# Patient Record
Sex: MALE | Race: WHITE | NOT HISPANIC OR LATINO | Employment: FULL TIME | URBAN - METROPOLITAN AREA
[De-identification: names, ages, dates, MRNs, and addresses within clinical notes are randomized per-mention and may not be internally consistent; named-entity substitution may affect disease eponyms.]

---

## 2023-08-18 PROBLEM — R73.09 ELEVATED GLUCOSE: Status: ACTIVE | Noted: 2023-08-18

## 2023-08-18 PROBLEM — E78.5 HYPERLIPIDEMIA: Status: ACTIVE | Noted: 2023-08-18

## 2023-08-18 PROBLEM — E83.52 HIGH CALCIUM LEVELS: Status: ACTIVE | Noted: 2023-08-18

## 2023-08-18 PROBLEM — E87.6 HYPOKALEMIA: Status: ACTIVE | Noted: 2023-08-18

## 2023-08-18 PROBLEM — L98.9 SKIN LESION OF BACK: Status: ACTIVE | Noted: 2023-08-18

## 2023-08-18 PROBLEM — I10 BENIGN ESSENTIAL HYPERTENSION: Status: ACTIVE | Noted: 2023-08-18

## 2023-08-18 RX ORDER — LOSARTAN POTASSIUM 100 MG/1
1 TABLET ORAL DAILY
COMMUNITY
Start: 2018-10-10

## 2023-08-18 RX ORDER — AMLODIPINE BESYLATE 10 MG/1
10 TABLET ORAL DAILY
COMMUNITY
Start: 2018-11-16 | End: 2024-04-29 | Stop reason: ALTCHOICE

## 2023-08-18 RX ORDER — POTASSIUM CHLORIDE 20 MEQ/1
20 TABLET, EXTENDED RELEASE ORAL DAILY PRN
COMMUNITY
Start: 2020-10-15

## 2023-08-18 RX ORDER — PRAVASTATIN SODIUM 40 MG/1
40 TABLET ORAL DAILY
COMMUNITY
Start: 2019-12-18 | End: 2024-04-29 | Stop reason: ALTCHOICE

## 2023-08-18 RX ORDER — CHLORTHALIDONE 50 MG/1
1 TABLET ORAL DAILY
COMMUNITY
Start: 2020-06-16

## 2023-10-27 ENCOUNTER — ANCILLARY PROCEDURE (OUTPATIENT)
Dept: RADIOLOGY | Facility: CLINIC | Age: 63
End: 2023-10-27
Payer: COMMERCIAL

## 2023-10-27 ENCOUNTER — OFFICE VISIT (OUTPATIENT)
Dept: ORTHOPEDIC SURGERY | Facility: CLINIC | Age: 63
End: 2023-10-27
Payer: COMMERCIAL

## 2023-10-27 DIAGNOSIS — M17.0 PRIMARY OSTEOARTHRITIS OF BOTH KNEES: Primary | ICD-10-CM

## 2023-10-27 DIAGNOSIS — M25.561 PAIN IN BOTH KNEES, UNSPECIFIED CHRONICITY: ICD-10-CM

## 2023-10-27 DIAGNOSIS — M25.562 PAIN IN BOTH KNEES, UNSPECIFIED CHRONICITY: ICD-10-CM

## 2023-10-27 PROCEDURE — 99214 OFFICE O/P EST MOD 30 MIN: CPT | Performed by: ORTHOPAEDIC SURGERY

## 2023-10-27 PROCEDURE — 99204 OFFICE O/P NEW MOD 45 MIN: CPT | Performed by: ORTHOPAEDIC SURGERY

## 2023-10-27 PROCEDURE — 73560 X-RAY EXAM OF KNEE 1 OR 2: CPT | Mod: LT

## 2023-10-27 PROCEDURE — 73562 X-RAY EXAM OF KNEE 3: CPT | Mod: RT

## 2023-10-27 PROCEDURE — 73564 X-RAY EXAM KNEE 4 OR MORE: CPT | Mod: LEFT SIDE | Performed by: RADIOLOGY

## 2023-10-28 NOTE — PROGRESS NOTES
Patient is a 62-year-old gentleman who presents today for evaluation of bilateral knee osteoarthritis.  The right is worse than left.  He has a history of remote arthroscopy on the right many many years ago.  He takes ibuprofen.  He has difficulty walking short distance.  He has a significant varus deformity.  He has difficulty going up and down stairs.  He extremity fed up with his quality of life.      Lateral knees:   AAOx3, NAD, walks with a moderate antalgic gait  Varus allignment  Range of motion lacks 10 degrees of full extension and flexes to 110 degrees  Stable to varus/valgus/anterior/posterior stress through out the range of motion  Slight laxity with varus stress  Diffuse medial  joint line tenderness to palpation  Moderate effusion  SILT in a leonidas/saph/per/tib distribution  5/5 knee extension/df/pf/ehl  ½ dorsalis pedis and posterior tibial pulse  no popliteal lymphadenopathy  no other overlying lesions  mood: euthymic  Respirations non labored    Plain films were reviewed by myself in clinic today.  They have advanced osteoarthritis of their knee with significant joint space narrowing, bone on bone changes, underlying sclerosis and tricompartmental osteophytic change.    Patient is now failed a greater than 3-month trial of reasonable conservative treatment and wishes proceed with surgery which is indicated at this time.  Discussed the risk benefits alternatives to surgery.  All of their questions were answered.    Procedure: right total hip  [place] [facility] [place]: M17.11  CPT: 26244  Stay: outpatient  Equipment: [facility]    I talked with the patient at length about risks, limitations, benefits and alternatives to total knee replacement today. I reviewed concerns about implant wear, loosening, breakage, infection and infection prophylaxis, DVT, PE, death and other medical and anesthetic complications of surgery. We talked about the potential for persistent pain following surgery since there  are many possible causes for knee and leg pain. The patient was advised that knee replacement will only relieve pain that is coming from the knee. We talked about limited range of motion following knee replacement and the importance of physical therapy and their motivation. We talked about improvements in pain management post-operatively and our accelerated rehab program. We talked about wound healing issues and neurovascular complications of surgery. I reviewed functional and activity restrictions in detail. We discussed the possible need for a homologous blood transfusion. We discussed the fact that many of our patients are able to go home in 1 day or less depending on their health, mobility, pre-op preparation, individual home situation and personal preference.  The patient has identified their personal goals of their joint replacement surgery and recovery and we have discussed them. These are documented in our [employer] patient engagement platform. In addition, we have discussed the advantages and disadvantages of various implant and fixation options, as well as various surgical approaches.  The basic concepts of the joint replacement procedure has been reviewed with the patient and the patient has been provided the opportunity to see an actual implant either in the office or in our pre-op education class.  All of the patients questions were answered. The patient can call my office to schedule surgery and the pre-op teaching class. I told the patient that they should contact their primary care physician to discuss fitness for surgery.    This note was created using voice recognition software and was not corrected for typographical or grammatical errors.

## 2023-11-04 ENCOUNTER — TRANSCRIBE ORDERS (OUTPATIENT)
Dept: OPERATING ROOM | Facility: HOSPITAL | Age: 63
End: 2023-11-04
Payer: COMMERCIAL

## 2023-11-04 DIAGNOSIS — M17.11 UNILATERAL PRIMARY OSTEOARTHRITIS, RIGHT KNEE: Primary | ICD-10-CM

## 2023-11-06 ENCOUNTER — HOSPITAL ENCOUNTER (OUTPATIENT)
Facility: HOSPITAL | Age: 63
Setting detail: OUTPATIENT SURGERY
End: 2023-11-06
Attending: ORTHOPAEDIC SURGERY | Admitting: ORTHOPAEDIC SURGERY
Payer: COMMERCIAL

## 2023-11-06 PROBLEM — M17.11 UNILATERAL PRIMARY OSTEOARTHRITIS, RIGHT KNEE: Status: ACTIVE | Noted: 2023-11-04

## 2023-11-21 ENCOUNTER — APPOINTMENT (OUTPATIENT)
Dept: PREADMISSION TESTING | Facility: HOSPITAL | Age: 63
End: 2023-11-21
Payer: COMMERCIAL

## 2024-01-11 ENCOUNTER — APPOINTMENT (OUTPATIENT)
Dept: ORTHOPEDIC SURGERY | Facility: CLINIC | Age: 64
End: 2024-01-11
Payer: COMMERCIAL

## 2024-03-09 ENCOUNTER — PATIENT MESSAGE (OUTPATIENT)
Dept: ORTHOPEDIC SURGERY | Facility: HOSPITAL | Age: 64
End: 2024-03-09
Payer: MEDICARE

## 2024-03-28 ENCOUNTER — OFFICE VISIT (OUTPATIENT)
Dept: ORTHOPEDIC SURGERY | Facility: CLINIC | Age: 64
End: 2024-03-28
Payer: MEDICARE

## 2024-03-28 VITALS — WEIGHT: 232.6 LBS | BODY MASS INDEX: 27.47 KG/M2 | HEIGHT: 77 IN

## 2024-03-28 DIAGNOSIS — M17.11 PRIMARY OSTEOARTHRITIS OF RIGHT KNEE: Primary | ICD-10-CM

## 2024-03-28 PROCEDURE — 99214 OFFICE O/P EST MOD 30 MIN: CPT | Performed by: ORTHOPAEDIC SURGERY

## 2024-03-28 PROCEDURE — 1036F TOBACCO NON-USER: CPT | Performed by: ORTHOPAEDIC SURGERY

## 2024-03-28 ASSESSMENT — PAIN - FUNCTIONAL ASSESSMENT: PAIN_FUNCTIONAL_ASSESSMENT: 0-10

## 2024-03-28 ASSESSMENT — PAIN SCALES - GENERAL: PAINLEVEL_OUTOF10: 5 - MODERATE PAIN

## 2024-03-28 ASSESSMENT — PAIN DESCRIPTION - DESCRIPTORS: DESCRIPTORS: ACHING;SORE;SHARP

## 2024-03-28 NOTE — PROGRESS NOTES
Patient is a 63-year-old gentleman who presents today for discussion of upcoming right total knee arthroplasty.  He is failed a greater than 3-month trial reasonable conservative treatment including anti-inflammatories, bracing and home exercise program.  He has a remote history of previous arthroscopy.  He has significant varus deformity.  He rates his pain at times as 10 out of 10.  He is difficulty walking any sort of distance.  He extremely fit up with quality of life and would like to proceed total knee arthroplasty which is indicated at this time.    Right knee:  AAOx3, NAD, walks with a moderate antalgic gait  Varus allignment  Range of motion lacks 10 degrees of full extension and flexes to 100 degrees  Stable to varus/valgus/anterior/posterior stress through out the range of motion  Slight laxity with varus stress  Diffuse medial  joint line tenderness to palpation  Moderate effusion  SILT in a leonidas/saph/per/tib distribution  5/5 knee extension/df/pf/ehl  ½ dorsalis pedis and posterior tibial pulse  no popliteal lymphadenopathy  no other overlying lesions  mood: euthymic  Respirations non labored    Plain films were reviewed by myself in clinic today.  They have severe osteoarthritis of their knee with significant joint space narrowing, bone on bone changes, underlying sclerosis and tricompartmental osteophytic change.    Patient is now failed a greater than 3-month trial of reasonable conservative treatment and wishes proceed with surgery which is indicated at this time.  Discussed the risk benefits alternatives to surgery.  All of their questions were answered.    Procedure: right total knee  Location: [facility] [place]  ICD10: M17.11  CPT: 93618  Stay: outpatient  Equipment: [facility]    I talked with the patient at length about risks, limitations, benefits and alternatives to total knee replacement today. I reviewed concerns about implant wear, loosening, breakage, infection and infection prophylaxis, DVT,  PE, death and other medical and anesthetic complications of surgery. We talked about the potential for persistent pain following surgery since there are many possible causes for knee and leg pain. The patient was advised that knee replacement will only relieve pain that is coming from the knee. We talked about limited range of motion following knee replacement and the importance of physical therapy and their motivation. We talked about improvements in pain management post-operatively and our accelerated rehab program. We talked about wound healing issues and neurovascular complications of surgery. I reviewed functional and activity restrictions in detail. We discussed the possible need for a homologous blood transfusion. We discussed the fact that many of our patients are able to go home in 1 day or less depending on their health, mobility, pre-op preparation, individual home situation and personal preference.  The patient has identified their personal goals of their joint replacement surgery and recovery and we have discussed them. These are documented in our [employer] patient engagement platform. In addition, we have discussed the advantages and disadvantages of various implant and fixation options, as well as various surgical approaches.  The basic concepts of the joint replacement procedure has been reviewed with the patient and the patient has been provided the opportunity to see an actual implant either in the office or in our pre-op education class.  All of the patients questions were answered. The patient can call my office to schedule surgery and the pre-op teaching class. I told the patient that they should contact their primary care physician to discuss fitness for surgery.    This note was created using voice recognition software and was not corrected for typographical or grammatical errors.

## 2024-04-17 ENCOUNTER — EDUCATION (OUTPATIENT)
Dept: ORTHOPEDIC SURGERY | Facility: HOSPITAL | Age: 64
End: 2024-04-17
Payer: MEDICARE

## 2024-04-29 ENCOUNTER — HOSPITAL ENCOUNTER (OUTPATIENT)
Dept: RADIOLOGY | Facility: HOSPITAL | Age: 64
Discharge: HOME | End: 2024-04-29
Payer: MEDICARE

## 2024-04-29 ENCOUNTER — PRE-ADMISSION TESTING (OUTPATIENT)
Dept: PREADMISSION TESTING | Facility: HOSPITAL | Age: 64
End: 2024-04-29
Payer: MEDICARE

## 2024-04-29 ENCOUNTER — LAB (OUTPATIENT)
Dept: LAB | Facility: LAB | Age: 64
End: 2024-04-29
Payer: MEDICARE

## 2024-04-29 ENCOUNTER — TELEPHONE (OUTPATIENT)
Dept: ORTHOPEDIC SURGERY | Facility: HOSPITAL | Age: 64
End: 2024-04-29

## 2024-04-29 VITALS
HEART RATE: 94 BPM | HEIGHT: 77 IN | WEIGHT: 230.05 LBS | RESPIRATION RATE: 14 BRPM | TEMPERATURE: 98.6 F | DIASTOLIC BLOOD PRESSURE: 94 MMHG | OXYGEN SATURATION: 95 % | BODY MASS INDEX: 27.16 KG/M2 | SYSTOLIC BLOOD PRESSURE: 140 MMHG

## 2024-04-29 DIAGNOSIS — Z01.818 PREOP TESTING: Primary | ICD-10-CM

## 2024-04-29 DIAGNOSIS — R73.09 ELEVATED GLUCOSE: ICD-10-CM

## 2024-04-29 DIAGNOSIS — M17.11 UNILATERAL PRIMARY OSTEOARTHRITIS, RIGHT KNEE: ICD-10-CM

## 2024-04-29 DIAGNOSIS — Z01.818 PREOP TESTING: ICD-10-CM

## 2024-04-29 LAB
ANION GAP SERPL CALC-SCNC: 14 MMOL/L (ref 10–20)
BASOPHILS # BLD AUTO: 0.02 X10*3/UL (ref 0–0.1)
BASOPHILS NFR BLD AUTO: 0.3 %
BUN SERPL-MCNC: 25 MG/DL (ref 6–23)
CALCIUM SERPL-MCNC: 10 MG/DL (ref 8.6–10.3)
CHLORIDE SERPL-SCNC: 100 MMOL/L (ref 98–107)
CO2 SERPL-SCNC: 27 MMOL/L (ref 21–32)
CREAT SERPL-MCNC: 1.05 MG/DL (ref 0.5–1.3)
EGFRCR SERPLBLD CKD-EPI 2021: 79 ML/MIN/1.73M*2
EOSINOPHIL # BLD AUTO: 0.04 X10*3/UL (ref 0–0.7)
EOSINOPHIL NFR BLD AUTO: 0.7 %
ERYTHROCYTE [DISTWIDTH] IN BLOOD BY AUTOMATED COUNT: 12.4 % (ref 11.5–14.5)
EST. AVERAGE GLUCOSE BLD GHB EST-MCNC: 117 MG/DL
GLUCOSE SERPL-MCNC: 101 MG/DL (ref 74–99)
HBA1C MFR BLD: 5.7 %
HCT VFR BLD AUTO: 47.5 % (ref 41–52)
HGB BLD-MCNC: 15.9 G/DL (ref 13.5–17.5)
IMM GRANULOCYTES # BLD AUTO: 0 X10*3/UL (ref 0–0.7)
IMM GRANULOCYTES NFR BLD AUTO: 0 % (ref 0–0.9)
LYMPHOCYTES # BLD AUTO: 1.34 X10*3/UL (ref 1.2–4.8)
LYMPHOCYTES NFR BLD AUTO: 22.3 %
MCH RBC QN AUTO: 29.7 PG (ref 26–34)
MCHC RBC AUTO-ENTMCNC: 33.5 G/DL (ref 32–36)
MCV RBC AUTO: 89 FL (ref 80–100)
MONOCYTES # BLD AUTO: 0.54 X10*3/UL (ref 0.1–1)
MONOCYTES NFR BLD AUTO: 9 %
NEUTROPHILS # BLD AUTO: 4.07 X10*3/UL (ref 1.2–7.7)
NEUTROPHILS NFR BLD AUTO: 67.7 %
NRBC BLD-RTO: NORMAL /100{WBCS}
PLATELET # BLD AUTO: 199 X10*3/UL (ref 150–450)
POTASSIUM SERPL-SCNC: 3.4 MMOL/L (ref 3.5–5.3)
RBC # BLD AUTO: 5.36 X10*6/UL (ref 4.5–5.9)
SODIUM SERPL-SCNC: 138 MMOL/L (ref 136–145)
WBC # BLD AUTO: 6 X10*3/UL (ref 4.4–11.3)

## 2024-04-29 PROCEDURE — 85025 COMPLETE CBC W/AUTO DIFF WBC: CPT

## 2024-04-29 PROCEDURE — 77073 BONE LENGTH STUDIES: CPT

## 2024-04-29 PROCEDURE — 36415 COLL VENOUS BLD VENIPUNCTURE: CPT

## 2024-04-29 PROCEDURE — 87081 CULTURE SCREEN ONLY: CPT

## 2024-04-29 PROCEDURE — 93005 ELECTROCARDIOGRAM TRACING: CPT

## 2024-04-29 PROCEDURE — 73562 X-RAY EXAM OF KNEE 3: CPT | Mod: RT

## 2024-04-29 PROCEDURE — 83036 HEMOGLOBIN GLYCOSYLATED A1C: CPT

## 2024-04-29 PROCEDURE — 99204 OFFICE O/P NEW MOD 45 MIN: CPT | Performed by: NURSE PRACTITIONER

## 2024-04-29 PROCEDURE — 93010 ELECTROCARDIOGRAM REPORT: CPT | Performed by: INTERNAL MEDICINE

## 2024-04-29 PROCEDURE — 80048 BASIC METABOLIC PNL TOTAL CA: CPT

## 2024-04-29 RX ORDER — METFORMIN HYDROCHLORIDE 500 MG/1
1 TABLET ORAL
COMMUNITY

## 2024-04-29 RX ORDER — METHYLPREDNISOLONE 4 MG
TABLET, DOSE PACK ORAL
COMMUNITY
End: 2024-06-11 | Stop reason: ALTCHOICE

## 2024-04-29 RX ORDER — IBUPROFEN 200 MG
200 TABLET ORAL EVERY 6 HOURS PRN
COMMUNITY
End: 2024-05-06 | Stop reason: HOSPADM

## 2024-04-29 RX ORDER — BISMUTH SUBSALICYLATE 262 MG
1 TABLET,CHEWABLE ORAL DAILY
COMMUNITY

## 2024-04-29 RX ORDER — CHLORHEXIDINE GLUCONATE ORAL RINSE 1.2 MG/ML
15 SOLUTION DENTAL DAILY
Qty: 30 ML | Refills: 0 | Status: SHIPPED | OUTPATIENT
Start: 2024-04-29 | End: 2024-05-06 | Stop reason: HOSPADM

## 2024-04-29 RX ORDER — ASPIRIN 81 MG/1
81 TABLET ORAL DAILY
COMMUNITY
End: 2024-05-06 | Stop reason: HOSPADM

## 2024-04-29 ASSESSMENT — PAIN - FUNCTIONAL ASSESSMENT: PAIN_FUNCTIONAL_ASSESSMENT: 0-10

## 2024-04-29 ASSESSMENT — ENCOUNTER SYMPTOMS
ENDOCRINE NEGATIVE: 1
NEUROLOGICAL NEGATIVE: 1
CARDIOVASCULAR NEGATIVE: 1
NECK NEGATIVE: 1
RESPIRATORY NEGATIVE: 1
GASTROINTESTINAL NEGATIVE: 1
EYES NEGATIVE: 1
CONSTITUTIONAL NEGATIVE: 1

## 2024-04-29 ASSESSMENT — PAIN DESCRIPTION - DESCRIPTORS: DESCRIPTORS: ACHING

## 2024-04-29 ASSESSMENT — PAIN SCALES - GENERAL: PAINLEVEL_OUTOF10: 3

## 2024-04-29 NOTE — TELEPHONE ENCOUNTER
Thank you for taking my call today.  All questions were answered at the time of the call, but please feel free to reach out to me via [employer] or phone, [phone], with any new questions or concerns.       We confirmed that you opted to enroll in our [id number] program so your discharge prescriptions will be available to take home at the time of discharge.  Please bring any prescription insurance coverage with you on the morning of surgery so that we can enter the information into our system.     We confirmed that your plan would be to Discharge Home same day (Rapid Recovery).    We confirmed that you have DME needed for recovery.     Use the provided body wash for 4 days before surgery and complete a 5th shower on the morning of surgery, this includes your body and hair.  Follow the directions as provided during preadmission testing.  The mouth wash will be used the night before and the morning of surgery.       As a reminder, if you do not hear from our team, please call [phone] between 2pm and 3pm the business day before your surgery to confirm your arrival time and details.        Please don't hesitate to reach out with additional questions or concerns.    Denise Singer MBA, BSN, RN-BC  ONESIMO HolleyN, RN  [facility]  [facility]  [phone]

## 2024-04-29 NOTE — CPM/PAT H&P
CPM/PAT Evaluation       Name: Toby Olea (Toby Olea)  /Age: 1960/63 y.o.     Visit Type:   In-Person       Chief Complaint: Knee pain/arthritis    62 yo male who is referred to PAT for evaluation by Dr Loving in advance of his R TKR on 24. He has failed conservative therapy.. He has a PMH of HTN, HPL, Hypercalcemia.  He has a remote history of previous arthroscopy.  He has significant varus deformity.  He rates his pain at times as 10 out of 10.  He is difficulty walking any sort of distance. He describes his knee discomfort as an ache when standing and walking, He takes advil for pain        Past Medical History:   Diagnosis Date    Hypertension 2006       Past Surgical History:   Procedure Laterality Date    APPENDECTOMY  2018    Appendectomy    COLONOSCOPY      KNEE ARTHROSCOPY W/ MENISCAL REPAIR  2018    Knee Arthroscopy With Medial Meniscus Repair    MENISCECTOMY      OTHER SURGICAL HISTORY  2018    Oral Surgery Tooth Extraction [place] [facility]    VASECTOMY  2018    Surgery Vas Deferens Vasectomy       Patient  has no history on file for sexual activity.    Family History   Problem Relation Name Age of Onset    Alcohol abuse Mother      Deafness Mother      Hearing loss Mother      COPD Mother      Arthritis Father      Asthma Father      Hypertension Father         No Known Allergies    Prior to Admission medications    Medication Sig Start Date End Date Taking? Authorizing Provider   amLODIPine (Norvasc) 10 mg tablet Take 1 tablet (10 mg) by mouth once daily. 18   Historical Provider, MD   chlorthalidone (Hygroton) 50 mg tablet Take 1 tablet (50 mg) by mouth once daily. 20   Historical Provider, MD   losartan (Cozaar) 100 mg tablet Take 1 tablet (100 mg) by mouth once daily. 10/10/18   Historical Provider, MD   potassium chloride CR (Klor-Con M20) 20 mEq ER tablet Take 1 tablet (20 mEq) by mouth once daily as needed. 10/15/20   Historical  Provider, MD   pravastatin (Pravachol) 40 mg tablet Take 1 tablet (40 mg) by mouth once daily. 12/18/19   Historical Provider, MD LINDSEY ROS:   Constitutional:   neg    Neuro/Psych:   neg    Eyes:   neg    Ears:   neg    Nose:   neg    Mouth:   neg    Throat:   neg    Neck:   neg    Cardio:   neg    Respiratory:   neg    Endocrine:   neg    GI:   neg    :   neg    Musculoskeletal:    See HPI  Hematologic:   neg    Skin:  neg        Physical Exam  Vitals and nursing note reviewed. Physical exam within normal limits.          PAT AIRWAY:   Airway:     Mallampati::  III    TM distance::  >3 FB    Neck ROM::  Full      Visit Vitals  BP (!) 140/94   Pulse 94   Temp 37 °C (98.6 °F) (Temporal)   Resp 14       DASI Risk Score    No data to display       Caprini DVT Assessment      Flowsheet Row Most Recent Value   DVT Score 8   Current Status Elective major lower extremity arthroplasty   Age 60-75 years   BMI 30 or less          Modified Frailty Index    No data to display       CHADS2 Stroke Risk  Current as of 23 minutes ago        N/A 3 to 100%: High Risk   2 to < 3%: Medium Risk   0 to < 2%: Low Risk     Last Change: N/A          This score determines the patient's risk of having a stroke if the patient has atrial fibrillation.        This score is not applicable to this patient. Components are not calculated.          Revised Cardiac Risk Index      Flowsheet Row Most Recent Value   Revised Cardiac Risk Calculator 0          Apfel Simplified Score    No data to display       Risk Analysis Index Results This Encounter    No data found in the last 1 encounters.       Stop Bang Score      Flowsheet Row Most Recent Value   Do you snore loudly? 1   Do you often feel tired or fatigued after your sleep? 0   Has anyone ever observed you stop breathing in your sleep? 0   Do you have or are you being treated for high blood pressure? 1   Recent BMI (Calculated) 27.3   Is BMI greater than 35 kg/m2? 0=No   Age older than 50  years old? 1=Yes   Is your neck circumference greater than 17 inches (Male) or 16 inches (Female)? 0   Gender - Male 1=Yes   STOP-BANG Total Score 4            Assessment and Plan   Denies dizziness, chest pain, pressure, palpitations, SOB, lightheadedness, LE swelling    Neuro:  No diagnosis or significant findings on chart review or clinical presentation and evaluation.     HEENT/Airway:  No diagnosis or significant findings on chart review or clinical presentation and evaluation.     Cardiovascular:  HTN- Hygroton 50 mg daily, Cozaar 100mg daily  HPL- stopped statin and takes red rice yeast  LESA: 0.6% risk for postoperative MACE  EKG - 4/29/24 NSR no acute changes per this author    Pulmonary:  No diagnosis or significant findings on chart review or clinical presentation and evaluation.   ARISCAT: <26 points, 1.6% risk of in-hospital postoperative pulmonary complication  PRODIGY: High risk for opioid induced respiratory depression  Discussed smoking cessation and deep breathing handout given    Renal:   Hypercalcemia  Pt at Moderate risk for perioperative ELENA based on Dynamic Predictive Scoring Tool for Perioperative ELENA  Lab Results   Component Value Date    GLUCOSE 103 (H) 04/14/2021    CALCIUM 10.0 04/14/2021     04/14/2021    K 4.0 07/12/2021    CO2 28 04/14/2021    CL 99 04/14/2021    BUN 26 (H) 04/14/2021    CREATININE 1.20 04/14/2021     Endocrine:  No diagnosis or significant findings on chart review or clinical presentation and evaluation.   Lab Results   Component Value Date    HGBA1C 5.4 12/20/2019     Hematologic:  No diagnosis or significant findings on chart review or clinical presentation and evaluation.    Pt supplied education/VTE handout  Lab Results   Component Value Date    WBC 5.0 06/16/2020    HGB 15.7 06/16/2020    HCT 46.7 06/16/2020    MCV 91 06/16/2020     06/16/2020       Gastrointestinal:   No diagnosis or significant findings on chart review or clinical presentation and  evaluation.     :  No diagnosis or significant findings on chart review or clinical presentation and evaluation.     Infectious disease:   No diagnosis or significant findings on chart review or clinical presentation and evaluation.   Prescription provided for CHG body wash and dental rinse. CHG use instructions reviewed and provided to patient.  Staph screen collected    Musculoskeletal:   See HPI    Preoperative risk assessment  ASA III  PHI - 4 points Risk for CARLOS A   NSQIP - Predicted length of stay days 0-1  ARISCAT - 3 points Low Risk 1.6%  DASI 58.2 Points 9.89 Mets  JHFRAT - points risk for falls  Clearance - not indicated  PAT Testing - CBC, BMP,  MRSA PCR, EKG  Results for orders placed or performed in visit on 04/29/24 (from the past 24 hour(s))   ECG 12 Lead   Result Value Ref Range    Ventricular Rate 85 BPM    Atrial Rate 85 BPM    IL Interval 160 ms    QRS Duration 90 ms    QT Interval 390 ms    QTC Calculation(Bazett) 464 ms    P Axis 33 degrees    R Axis 1 degrees    T Axis 38 degrees    QRS Count 14 beats    Q Onset 213 ms    P Onset 133 ms    P Offset 195 ms    T Offset 408 ms    QTC Fredericia 438 ms     Lab Results   Component Value Date    GLUCOSE 101 (H) 04/29/2024    CALCIUM 10.0 04/29/2024     04/29/2024    K 3.4 (L) 04/29/2024    CO2 27 04/29/2024     04/29/2024    BUN 25 (H) 04/29/2024    CREATININE 1.05 04/29/2024     Lab Results   Component Value Date    WBC 6.0 04/29/2024    HGB 15.9 04/29/2024    HCT 47.5 04/29/2024    MCV 89 04/29/2024     04/29/2024         *CLEARED FOR SURGERY       PENDING LABS/EKG-LABS REVIEWED, STABLE. OK TO PROCEED    Anesthesia:  No anesthesia complications    denies dental issues  Nonsmoker  3 drinks per week  no Drug abuse  no personal/family issues with Anesthesia  No nickel, shell fish, or iodine allergies    Patient is at acceptable risk to proceed with planned surgical procedure. Further cardiac risk stratification deferred at this  time.This patient is low risk candidate undergoing moderate risk procedure, patient is medically optimized for surgery    Discussed with patient medication instructions, NPO guidelines, and any questions or concerns. Patient does not need further workup prior to preceding with elective surgery based on based on risk assessment. .     CHLORHEXIDINE .12% DENTAL RINSE E PRESCIRBED PER  INFECTION PREVENTION PROTOCOL. PATIENT EDUCATED   Patient advised to call [facility] if does not receive Mouthwash    Face to Face patient contact time 30    NATY Adams 4/29/2024 10:12 AM

## 2024-04-29 NOTE — PREPROCEDURE INSTRUCTIONS
Medication List            Accurate as of April 29, 2024  9:39 AM. Always use your most recent med list.                aspirin 81 mg EC tablet  Medication Adjustments for Surgery: Stop 7 days before surgery     chlorhexidine 0.12 % solution  Commonly known as: Peridex  Use 15 mL in the mouth or throat once daily for 2 doses.     chlorthalidone 50 mg tablet  Commonly known as: Hygroton  Medication Adjustments for Surgery: Take morning of surgery with sip of water, no other fluids  Notes to patient: If a morning med     CHOLECALCIFEROL (VITAMIN D3) ORAL  Medication Adjustments for Surgery: Stop 7 days before surgery     co Q10-red yeast rice  mg capsule  Medication Adjustments for Surgery: Stop 7 days before surgery     FISH OIL ORAL  Medication Adjustments for Surgery: Stop 7 days before surgery     Glucosamine 500 mg tablet  Generic drug: glucosamine sulfate  Medication Adjustments for Surgery: Stop 7 days before surgery     ibuprofen 200 mg tablet  Medication Adjustments for Surgery: Stop 7 days before surgery     Klor-Con M20 20 mEq ER tablet  Generic drug: potassium chloride CR  Medication Adjustments for Surgery: Take morning of surgery with sip of water, no other fluids  Notes to patient: If a morning med     losartan 100 mg tablet  Commonly known as: Cozaar  Medication Adjustments for Surgery: Stop 1 day before surgery  Notes to patient: Take early morning 5/5/24     metFORMIN 500 mg tablet  Commonly known as: Glucophage  Medication Adjustments for Surgery: Stop 1 day before surgery     multivitamin tablet  Medication Adjustments for Surgery: Stop 7 days before surgery     SAW PALMETTO ORAL  Medication Adjustments for Surgery: Stop 7 days before surgery     TURMERIC ORAL  Medication Adjustments for Surgery: Stop 7 days before surgery            LAST DOSE OF VITAMINS, SUPPLEMENTS, HERBS, PROBIOTICS, AND FISH OIL, NO MOTRIN OR ADVIL OR ALEVE AFTER 4/28/24    Ok to take Tylenol 2-500mg tablets every 8  hr around the clock for pain and ok on morning of surgery if needed    CONTACT SURGEON'S OFFICE IF YOU DEVELOP:  * Fever = 100.4 F   * New respiratory symptoms (e.g. cough, shortness of breath, respiratory distress, sore throat)  * Recent loss of taste or smell  *Flu like symptoms such as headache, fatigue or gastrointestinal symptoms  * You develop any open sores, shingles, burning or painful urination   AND/OR:  * You no longer wish to have the surgery.  * Any other personal circumstances change that may lead to the need to cancel or defer this surgery.  *You were admitted to any hospital within one week of your planned procedure.    SMOKING:  *Quitting smoking can make a huge difference to your health and recovery from surgery.    *If you need help with quitting, call 8-977-QUIT-NOW.    THE DAY BEFORE SURGERY:  *Do not eat any food after midnight the night before your surgery.     Preoperative Fasting Guidelines    Why must I stop eating and drinking near surgery time?  With sedation, food or liquid in your stomach can enter your lungs causing serious complications  Increases nausea and vomiting    When do I need to stop eating and drinking before my surgery?  Do not eat any food after midnight the night before your surgery/procedure.  According to the 2023 [employer] Anest[employer] Patients who drink clear liquids on the morning of surgery have improved postoperative blood sugars, better outcomes, decreased length of stay, less nausea, less anxiety.   You may have up to 13.5 ounces of clear liquid until TWO hours before your instructed arrival time to the hospital.  This includes water, black tea/coffee, (no milk or cream) apple juice, and electrolyte drinks (Gatorade),   You may chew gum until TWO hours before your surgery/procedure      SURGICAL TIME:  *You will be contacted between 2 p.m. and 3 p.m. the business day before your surgery with your arrival time.  *If you haven't received a call by  3pm, call [phone]  *Scheduled surgery times may change and you will be notified if this occurs-check your personal voicemail for any updates.    ON THE MORNING OF SURGERY:  *Wear comfortable, loose fitting clothing.   *Do not use moisturizers, creams, lotions or perfume.  *All jewelry and valuables should be left at home.  *Prosthetic devices such as contact lenses, hearing aids, dentures, eyelash extensions, hairpins and body piercing must be removed before surgery.    BRING WITH YOU:  *Photo ID and insurance card  *Current list of medications and allergies  *Pacemaker/Defibrillator/Heart stent cards  *CPAP machine and mask  *Slings/splints/crutches  *Copy of your complete Advanced Directive/DHPOA-if applicable  *Neurostimulator implant remote    PARKING AND ARRIVAL:  *Check in at the Main Entrance desk and let them know you are here for surgery.    IF YOU ARE HAVING OUTPATIENT/SAME DAY SURGERY:  *A responsible adult MUST accompany you at the time of discharge and stay with you for 24 hours after your surgery.  *You may NOT drive yourself home after surgery.  *You may use a taxi or ride sharing service ([employer], Uber) to return home ONLY if you are accompanied by a friend or family member.  *Instructions for resuming your medications will be provided by your surgeon.    Thank you for coming to Pre Admission testing.     If I have prescribe medication please don't forget to  at your pharmacy.     Any questions about today's visit call [phone] and leave a message in the general mailbox.    Patient instructed to ambulate as soon as possible postoperatively to decrease thromboembolic risk.    Samantha Bundy, APRN-CNP    Thank you for visiting [facility].  If you have any changes to your health condition, please call the surgeons office to alert them and give them details of your symptoms.    APRN-C  Department of Anesthesiology and Perioperative Medicine      Additional  Instructions:     The Day before Surgery:  -Review your medication instructions, stop indicated medications  -You will be contacted in the evening regarding the time of your arrival to facility and surgery time    Day of Surgery:  -Review your medication instructions, take indicated medications  -Wear comfortable loose fitting clothing  -Do not use moisturizers, creams, lotions or perfume  -All jewelry and valuables should be left at home    Preoperative Deep Breathing Exercises    Why it is important to do deep breathing exercises before my surgery?  Deep breathing exercises strengthen your breathing muscles.  This helps you to recover after your surgery and decreases the chance of breathing complications.      How are the deep breathing exercises done?  Sit straight with your back supported.  Breathe in deeply and slowly through your nose. Your lower rib cage should expand and your abdomen may move forward.  Hold that breath for 3 to 5 seconds.  Breathe out through pursed lips, slowly and completely.  Rest and repeat 10 times every hour while awake.  Rest longer if you become dizzy or lightheaded.        Patient Information: Incentive Spirometer  What is an incentive spirometer?  An incentive spirometer is a device used before and after surgery to “exercise” your lungs.  It helps you to take deeper breaths to expand your lungs.  Below is an example of a basic incentive spirometer.  The device you receive may differ slightly but they all function the same.    Why do I need to use an incentive spirometer?  Using your incentive spirometer prepares your lungs for surgery and helps prevent lung problems after surgery.  How do I use my incentive spirometer?  When you're using your incentive spirometer, make sure to breathe through your mouth. If you breathe through your nose, the incentive spirometer won't work properly. You can hold your nose if you have trouble.  If you feel dizzy at any time, stop and rest. Try again  at a later time.  Follow the steps below:  Set up your incentive spirometer, expand the flexible tubing and connect to the outlet.  Sit upright in a chair or bed. Hold the incentive spirometer at eye level.   Put the mouthpiece in your mouth and close your lips tightly around it. Slowly breathe out (exhale) completely.  Breathe in (inhale) slowly through your mouth as deeply as you can. As you take a breath, you will see the piston rise inside the large column. While the piston rises, the indicator should move upwards. It should stay in between the 2 arrows (see Figure).  Try to get the piston as high as you can, while keeping the indicator between the arrows.   If the indicator doesn't stay between the arrows, you're breathing either too fast or too slow.  When you get it as high as you can, hold your breath for 10 seconds, or as long as possible. While you're holding your breath, the piston will slowly fall to the base of the spirometer.  Once the piston reaches the bottom of the spirometer, breathe out slowly through your mouth. Rest for a few seconds.  Repeat 10 times. Try to get the piston to the same level with each breath.  Repeat every hour while awake  You can carefully clean the outside of the mouthpiece with an alcohol wipe or soap and water.         Ways You Can Help Prevent Blood Clots             This handout explains some simple things you can do to help prevent blood clots.      Blood clots are blockages that can form in the body's veins. When a blood clot forms in your deep veins, it may be called a deep vein thrombosis, or DVT for short. Blood clots can happen in any part of the body where blood flows, but they are most common in the arms and legs. If a piece of a blood clot breaks free and travels to the lungs, it is called a pulmonary embolus (PE). A PE can be a very serious problem.         Being in the hospital or having surgery can raise your chances of getting a blood clot because you may not  be well enough to move around as much as you normally do.         Ways you can help prevent blood clots in the hospital         Wearing SCDs. SCDs stands for Sequential Compression Devices.   SCDs are special sleeves that wrap around your legs  They attach to a pump that fills them with air to gently squeeze your legs every few minutes.   This helps return the blood in your legs to your heart.   SCDs should only be taken off when walking or bathing.   SCDs may not be comfortable, but they can help save your life.               Wearing compression stockings - if your doctor orders them. These special snug fitting stockings gently squeeze your legs to help blood flow.       Walking. Walking helps move the blood in your legs.   If your doctor says it is ok, try walking the halls at least   5 times a day. Ask us to help you get up, so you don't fall.      Taking any blood thinning medicines your doctor orders.        Page 1 of 2     [facility]; 3/23   Ways you can help prevent blood clots at home       Wearing compression stockings - if your doctor orders them. ? Walking - to help move the blood in your legs.       Taking any blood thinning medicines your doctor orders.      Signs of a blood clot or PE      Tell your doctor or nurse know right away if you have of the problems listed below.    If you are at home, seek medical care right away. Call 911 for chest pain or problems breathing.               Signs of a blood clot (DVT) - such as pain,  swelling, redness or warmth in your arm or leg      Signs of a pulmonary embolism (PE) - such as chest     pain or feeling short of breath

## 2024-04-29 NOTE — TELEPHONE ENCOUNTER
Please call [phone] at your earliest convenience to discuss details for your upcoming surgery with Dr. Christian Loving.  I can be reached during normal business hours, Monday through Friday.    Thanks,  Denise Singer MBA, BSN, RN-BC  ONESIMO HolleyN-RN  [facility]  [facility]  [phone]

## 2024-05-01 LAB — STAPHYLOCOCCUS SPEC CULT: NORMAL

## 2024-05-02 LAB
ATRIAL RATE: 85 BPM
P AXIS: 33 DEGREES
P OFFSET: 195 MS
P ONSET: 133 MS
PR INTERVAL: 160 MS
Q ONSET: 213 MS
QRS COUNT: 14 BEATS
QRS DURATION: 90 MS
QT INTERVAL: 390 MS
QTC CALCULATION(BAZETT): 464 MS
QTC FREDERICIA: 438 MS
R AXIS: 1 DEGREES
T AXIS: 38 DEGREES
T OFFSET: 408 MS
VENTRICULAR RATE: 85 BPM

## 2024-05-02 NOTE — CPM/PAT H&P
CPM/PAT Evaluation       Name: Toby Olea (Toby Olea)  /Age: 1960/63 y.o.       PATIENT PRE-OP LABS   POTASSIUM OF 3.4 ON 2024  PATIENT HAS NO PCP LISTED  ZECHARIAH DERAS PA-C NOTIFIED

## 2024-05-05 PROBLEM — M17.11 LOCALIZED OSTEOARTHRITIS OF RIGHT KNEE: Status: ACTIVE | Noted: 2024-05-05

## 2024-05-05 RX ORDER — METFORMIN HYDROCHLORIDE 500 MG/1
500 TABLET ORAL
Status: CANCELLED | OUTPATIENT
Start: 2024-05-06

## 2024-05-05 RX ORDER — OXYCODONE HYDROCHLORIDE 5 MG/1
5 TABLET ORAL EVERY 6 HOURS PRN
Qty: 28 TABLET | Refills: 0 | Status: SHIPPED | OUTPATIENT
Start: 2024-05-05 | End: 2024-05-13

## 2024-05-05 RX ORDER — LOSARTAN POTASSIUM 100 MG/1
100 TABLET ORAL DAILY
Status: CANCELLED | OUTPATIENT
Start: 2024-05-05

## 2024-05-05 RX ORDER — ACETAMINOPHEN 500 MG
1000 TABLET ORAL EVERY 6 HOURS PRN
Qty: 240 TABLET | Refills: 0 | Status: SHIPPED | OUTPATIENT
Start: 2024-05-05 | End: 2024-06-11

## 2024-05-05 RX ORDER — ASPIRIN 81 MG/1
81 TABLET ORAL 2 TIMES DAILY
Qty: 60 TABLET | Refills: 0 | Status: SHIPPED | OUTPATIENT
Start: 2024-05-05 | End: 2024-06-11

## 2024-05-05 RX ORDER — POLYETHYLENE GLYCOL 3350 17 G/17G
17 POWDER, FOR SOLUTION ORAL DAILY
Qty: 238 G | Refills: 0 | Status: SHIPPED | OUTPATIENT
Start: 2024-05-05 | End: 2024-06-11 | Stop reason: ALTCHOICE

## 2024-05-05 RX ORDER — PANTOPRAZOLE SODIUM 40 MG/1
40 TABLET, DELAYED RELEASE ORAL DAILY
Qty: 30 TABLET | Refills: 0 | Status: SHIPPED | OUTPATIENT
Start: 2024-05-05 | End: 2024-06-05

## 2024-05-05 RX ORDER — TRAMADOL HYDROCHLORIDE 50 MG/1
50 TABLET ORAL EVERY 6 HOURS PRN
Qty: 28 TABLET | Refills: 0 | Status: SHIPPED | OUTPATIENT
Start: 2024-05-05 | End: 2024-05-13

## 2024-05-05 RX ORDER — CHLORTHALIDONE 25 MG/1
50 TABLET ORAL DAILY
Status: CANCELLED | OUTPATIENT
Start: 2024-05-05

## 2024-05-05 RX ORDER — MELOXICAM 15 MG/1
15 TABLET ORAL DAILY
Qty: 30 TABLET | Refills: 0 | Status: SHIPPED | OUTPATIENT
Start: 2024-05-05 | End: 2024-06-05

## 2024-05-05 RX ORDER — DOCUSATE SODIUM 100 MG/1
100 CAPSULE, LIQUID FILLED ORAL 2 TIMES DAILY
Qty: 60 CAPSULE | Refills: 0 | Status: SHIPPED | OUTPATIENT
Start: 2024-05-05 | End: 2024-06-05

## 2024-05-05 NOTE — DISCHARGE INSTRUCTIONS
MD Alka Perkins MPAS, SHE, ATC  Adult Reconstruction and Joint Replacement Surgery  Phone: [phone]     Fax: [phone]        DISCHARGE INSTRUCTIONS      PLEASE READ CAREFULLY BEFORE CONTACTING YOUR PROVIDER.    WE WORK COLLABORATIVELY AS A TEAM. CALLING MULTIPLE STAFF MEMBERS REGARDING THE SAME ISSUE WILL DELAY YOUR CARE.    [employer] IS THE PREFERRED COMMUNICATION FOR ALL TEAM MEMBERS.    POSTOPERATIVE INSTRUCTIONS: TOTAL HIP & TOTAL KNEE ARTHROPLASTY    JOINT CARE TEAM  Please use the information below to contact your care team following surgery.  If you are leaving a message or using the [employer] Chart portal, please include your full name, date of birth and date of surgery so that we can correctly identify you.  Your call will be returned within 1-2 business days, please do not leave multiple messages with other staff regarding a single issue while you are awaiting a return call.     Who to call Contact Information Matters needing handled   Alka Quevedo PA-C  Physician Assistant [facility]   Medical questions/concerns       Jaelyn Hernandez-    [email or web address]           [phone]  opt. 2    [phone] fax  [phone]         Scheduling office Visits  Medical questions/concerns  Leave of Absence or other paperwork  Any concerns more than 6 weeks from surgery - an appointment will need to be made   Denise Singer MBA, BSN, RN-BC  Ortho Nurse Navigator [place]    Dee Guerrero RN, BSN  Ortho Nurse Navigator [place]        __________________________________    Jun Sahni RN, BSN  Ortho Coordinator [facility] ECHOLSN-BC  Ortho Nurse Navigator [facility]       [phone] Please call staff at the institution in which you had surgery for prescription refills        Prescription Refills  Nursing, medical question related questions or concerns within 6 weeks of surgery   Orders  for Outpatient Physical Therapy             MEDICATION REFILLS - MyChart or Nurse Navigator (Above) at the institution in which you had surgery. [facility] or [facility].    -You will NOT receive a call indicating that your prescription has been filled.  Please contact your pharmacy with any questions.    Medication refills will be filled Monday-Friday 7am to 1pm ONLY. Please call the nurse navigator office or send a [employer] message for a refill request.  Any requests received outside of this timeframe will be handled on the next business day.  Please do not call multiple times or call other members of the care team for medication needs, this will cause the refill to take longer.    Per State and Institutional policy, pain medications can only be refilled every 7 days for up to six weeks following surgery.    My Chart Portal: If you are using the My Chart portal and are requesting a medication refill, please list what type of surgery you had and left or right side, medication that needs refilled, and pharmacy you would like your medication sent.     WEIGHT BEARING- weight bearing as tolerated to operative extremity     ACTIVITY-As Tolerated    DRIVING & TRAVEL AFTER SURGERY   Patients should anticipate waiting at least 4-6 weeks before traveling long distances after surgery.  You will need to stop to walk around ever 1 hour during your travel to help with blood clot prevention.    Patients may not drive until cleared by the joint nurse or the office and you are off of all narcotics.    DENTAL PROCEDURES & CLEANINGS  You must wait a minimum of 3 months for elective dental appointments after a total joint replacement, including routine cleanings or dental work including bridges, crowns, extractions, etc.. Unless, it is an emergency. You will need a prophylactic antibiotic lifelong prior to any dental visit, cleaning or procedure. Your surgeon's office or your dentist may provide a prescription antibiotic. Antibiotics are  a lifelong need before dental appointments.      You do not need antibiotics for endoscopic procedures such as colonoscopy or EGD, dermatologic biopsies or eye surgeries.    WOUND CARE  If you experience continued drainage or bleeding, you may cover with abdominal/Maxi pads (purchase at local drug store).  Knee replacements should wrap with an ace wrap.  You may shower with waterproof dressing on. Your surgical bandage will be removed by your home therapist 1 week after surgery. If you have staples intact, home care will remove in 2 weeks. If you have sutures intact, you will need to return to the office in 2 weeks for suture removal. Once the dressing is removed by home care, you may continue to shower. Let soap and water wash over the wound. DO NOT SCRUB.  Steri-strips under the bandage will remain in place until they fall off on their own.  If they are loose, you may gently remove.  If they have not fallen off in two weeks, gently peel them off. Do not remove if pulling causes resistance against the incision.  You will see suture tails sticking out of the ends of the incision.  DO NOT CUT THEM.  They will fall off when the sutures dissolve.  If they are bothersome, cover with a band aid.  Do not soak in a bath tub, hot tub, pool or lake until you are 8 weeks out from surgery.  Do not apply lotions, creams or ointments until you are 6 weeks out from surgery,    PAIN, SWELLING, BRUISING & CLICKING  Pain and swelling are a natural part of your recovery which is considered normal for up to a year after surgery.  Symptoms may be treated with movement, ice, compression stockings, elevating your leg, and by following the pain medication regimen as prescribed.  Bruising is normal for several weeks after surgery. You may also have leg swelling and pain in your shin.  You may ice areas that are tender to help with discomfort.  You are required to wear the provided compression stockings, every day, for 4 weeks following  surgery.  Remove the stockings at night and place them back on in the morning.  Pain and swelling may temporarily increase with an increase in activity or exercise.  Use ice after activity.  Audible clicking with movement or exercises is considered normal following joint replacement.  If this persists at 6 months or 1 year, please notify your surgeon.  You may also feel decreased sensation or numbness near the incision site.  This is normal and sensation may or may not return.    PERSONAL HYGIENE  You may shower upon discharging from the hospital.  Soap and water is permitted to run over the surgical dressing, steri-strips and incision.  Do not scrub directly over these items.  DO NOT soak your incision in a bath, hot tub, pool or pond/lake for a minimum of 8 weeks following your surgery.  DO NOT use lotions, creams, ointments on your wound for a minimum of 6 weeks following your surgery. At that time you may use vitamin E to assist with softening of your incision.      RESTARTING HOME ROUTINE - DIET & MEDICATIONS  Post-operative constipation can result due to a combination of inactivity, anesthesia and pain medication. To help prevent this, you should increase your water and fiber intake. Physical activity such as walking will also help stimulate the bowels.   You may resume your normal diet when you discharge home.    To avoid constipation, choose foods that help promote good bowel habits, such as foods high in fiber.  You may restart your home medications the following day after your surgery UNLESS you have been given alternate instructions.  Follow the instructions given to you on your hospital discharge instructions for more information regarding your home medications.  IF YOU EXPERIENCE NAUSEA OR DIARRHEA, FOLLOW THE B.R.A.T. DIET UNTIL SYMPTOMS RESOLVE.  If you are experiencing vomiting that lasts more than 24 hours, please contact your joint nurse.      IN-HOME PHYSICAL THERAPY & OUTPATIENT PHYSICAL  THERAPY  In-home physical therapy will start 1-2 days after you get home from the hospital.    The home care agency will call within the first 24-48 hours to set up their first visit.  Please do not call your care team to inquire during this timeframe.  Continue the exercises you were given in the hospital until you have been seen by in-home therapy.  Make sure to provide a phone number with the ability for the home care staff to leave a message if you do not answer your phone.    Outpatient physical therapy following knee replacement surgery should begin 2-3 weeks after surgery.  You will be given physical therapy order prior to discharge from the hospital. You should call to schedule this appointment ASAP if not already scheduled before surgery.  Waiting until you are ready for outpatient physical therapy will cause a delay in your care.  You may choose any outpatient physical therapy location.      EMERGENCIES - WHEN TO CONTACT THE SURGEON'S OFFICE IMMEDIATELY  Fever >101 with chills that has been present for at least 48 hours.   Excessive bleeding from incision that will not slow down. A small amount of drainage is normal and expected.  Once pressure is applied and the area is covered, do not continue to check the area regularly.  This will remove pressure and bleeding will continue.  Leave in place for 4-6 hours.  Signs of infection of incision-excessive drainage that is soaking through your dressing (especially if it is pus-like), redness that is spreading out from the edges of your incision, or increased warmth around the area.  Excruciating pain for which the pain medication, taken as instructed, is not helping.  Severe calf pain.  Go directly to the emergency room or call 911, if you are experiencing chest pain or difficulty breathing.    After Hour and Weekend Emergency Answering Service [phone]    ICE & COLD THERAPY INSTRUCTIONS    To assist with pain control and post-op swelling, you should be using  ice regularly throughout recovery, especially for the first 6 weeks, regardless of the cold therapy method you use.      Always make sure there is a layer of protection between the cold pad and your skin.    If you are using ICE PACKS or GEL PACKS, you will need to alternate 20 minutes on, 20 minutes off twice per hour.    If you are using an ICE MACHINE, please follow the provided ice machine instructions.  These devices differ from ice or ice packs whereas the mechanism circulates water through tubing and a pad to provide longer periods of cold therapy to the desired site.  You can use your cold devices around the clock for optimal comfort.  We recommend using cold therapy after working with therapy or completing exercises on your own.  There is no set schedule in which you must follow while using cold therapy.  Below are a few points to remember when using a cold therapy device:    You do not need to need to use the 20 on, 20 off method.  Detach the pad from the cooler and ambulate at least once every hour.  You can check your skin under the pad at this time.  You may wear the cold therapy device during periods of sleep including overnight.  If you wake up during the night, you can check the skin at this time.  You do not need to wake up specifically to perform skin checks.  Empty the cooler and pad when device is not in use.  Follow 's instructions for cleaning your cold therapy device.    DISCHARGE MEDICATIONS - Please reference the sample schedule on the reverse side for instructions on how to best schedule medications.    PAIN MEDICATION    ___X_ Tramadol / Oxycodone  Tramadol and Oxycodone have been prescribed for post-operative pain control.    These medications will only be refilled ONCE every 7 days for a period of up to 6 weeks following surgery.  After 6 weeks, you will transition to acetaminophen and over -the- counter anti-inflammatories such as Ibuprofen, Advil or Aleve in conjunction  with ICE/COLD THERAPY.   Side effects may be constipation and nausea, vomiting, sleepiness, dizziness, lightheadedness, headache, blurred vision, dry mouth sweating, itching (if you have itching, over-the -counter Benadryl can be used as needed).  You may NOT operate a motor vehicle while taking these medications or have been cleared by your care team.     ___X_ Acetaminophen (Tylenol)  Acetaminophen has been prescribed as an adjunct for pain control. Take two 500 mg tablets every 6 hours for 4 weeks. You will not receive a refill on this medication.  Do not exceed 4000mg of acetaminophen within a 24 hour period.  Side effects may include nausea, heartburn, drowsiness, and headache.    ___X_ Meloxicam (Mobic)-Meloxicam has been prescribed as an adjunct anti-inflammatory to assist in pain control.    Take one 15mg tablet once daily for 4 weeks.  You will not receive refills on this medication.   Side effects may include nausea.  May not be prescribed if you are on a more potent blood thinner than aspirin or have chronic kidney disease.    BLOOD THINNER    ___X_ Blood Thinner   A blood thinner has been prescribed to prevent blood clots in your leg or lungs. Take as prescribed on the bottle for 4 weeks. You will not receive a refill on this medication.    ANTI NAUSEA    ___X_ Proton Pump Inhibitor (PPI)-Stomach Acid Reduction Medication  If you are already on a PPI, you will continue your regular medication. If you are not, you will be prescribed Pantoprazole to help with nausea and protect your stomach while taking pain medication.  You will not receive a refill on this medication.    STOOL SOFTENERS    ___X_ Colace (Docusate Sodium) & Miralax (polyethylene glycol)  Take both medications to help with constipation while using the Oxycodone and Tramadol for pain control.  You will not receive a refill on this medication.    Continued Constipation  If you continue to be constipated despite daily use of Miralax and  Colace, you try an over-the-counter Dulcolax Suppository and use per instructions on the package.       SPECIAL INSTRUCTIONS   ***    You will not receive refills on the following medications.   Acetaminophen (Tylenol  Meloxicam  Miralax  Colace  Proton Pump Inhibitor (PPI)  Blood Thinner    Pain Medication Refills - Ortho Nurse Navigator or MyChart- Monday through Friday 7am-1pm    FOLLOW-UP- You should have an appointment with either Dr. Loving or KENN Ovalle in 6 weeks.         SAMPLE              The times below are an example of how to organize medications to optimize pain control  Your actual medication schedule may vary based on your last dose taken IN THE HOSPITAL      Time 3:00 am 6:00 am 9:00 am 12:00 pm 3:00 pm 6:00 pm 9:00 pm 12:00 am   Medications Tramadol Tylenol  Oxycodone  Miralax   Blood Thinner  Colace  Pantoprazole or other PPI  Tramadol  Meloxicam Tylenol  Oxycodone Tramadol Tylenol  Oxycodone  Miralax Blood Thinner  Colace  Tramadol   Tylenol  Oxycodone            You may begin to wean off the pain medication as your pain remains controlled with increased activity.  The schedules provided are meant to serve as an example.  You may wean off based on your pain control.  Please note that pain medications are not filled beyond 6 weeks after surgery.              The times below are an example of how to WEAN OFF medications WHILE CONTINUING TO OPTIMIZE PAIN CONTROL.  Your actual medication schedule may vary based on your last dose taken.    Time 12:00am 4:00am 8:00am 12:00pm 4:00pm 8:00pm   Med Tramadol Oxycodone   Tramadol Oxycodone Tramadol Oxycodone     Time 12:00am 6:00am 12:00pm 6:00pm   Med Tramadol Oxycodone   Tramadol Oxycodone     Time 12:00am 8:00am 4:00pm   Med Tramadol Oxycodone   Tramadol     Time 12:00am 12:00pm   Med Tramadol Tramadol         TOTAL KNEE REPLACEMENT PATIENTS SHOULD TRANSITION TO OUTPATIENT PHYSICAL THERAPY NO MORE THAN 3 WEEKS FOLLOWING SURGERY.  PLEASE SEE THE  LIST OF  FACILITIES BELOW.  CALL TO SCHEDULE YOUR FIRST APPOINTMENT BEFORE YOU HAVE YOUR SURGERY.

## 2024-05-06 ENCOUNTER — DOCUMENTATION (OUTPATIENT)
Dept: HOME HEALTH SERVICES | Facility: HOME HEALTH | Age: 64
End: 2024-05-06

## 2024-05-06 ENCOUNTER — ANESTHESIA (OUTPATIENT)
Dept: OPERATING ROOM | Facility: HOSPITAL | Age: 64
End: 2024-05-06
Payer: MEDICARE

## 2024-05-06 ENCOUNTER — PHARMACY VISIT (OUTPATIENT)
Dept: PHARMACY | Facility: CLINIC | Age: 64
End: 2024-05-06
Payer: COMMERCIAL

## 2024-05-06 ENCOUNTER — HOSPITAL ENCOUNTER (OUTPATIENT)
Facility: HOSPITAL | Age: 64
Discharge: HOME | End: 2024-05-06
Attending: ORTHOPAEDIC SURGERY | Admitting: ORTHOPAEDIC SURGERY
Payer: MEDICARE

## 2024-05-06 ENCOUNTER — ANESTHESIA EVENT (OUTPATIENT)
Dept: OPERATING ROOM | Facility: HOSPITAL | Age: 64
End: 2024-05-06
Payer: MEDICARE

## 2024-05-06 ENCOUNTER — HOME HEALTH ADMISSION (OUTPATIENT)
Dept: HOME HEALTH SERVICES | Facility: HOME HEALTH | Age: 64
End: 2024-05-06
Payer: MEDICARE

## 2024-05-06 VITALS
TEMPERATURE: 98.1 F | WEIGHT: 227.74 LBS | BODY MASS INDEX: 27.73 KG/M2 | SYSTOLIC BLOOD PRESSURE: 121 MMHG | RESPIRATION RATE: 18 BRPM | HEIGHT: 76 IN | DIASTOLIC BLOOD PRESSURE: 81 MMHG | OXYGEN SATURATION: 95 % | HEART RATE: 60 BPM

## 2024-05-06 DIAGNOSIS — M17.11 UNILATERAL PRIMARY OSTEOARTHRITIS, RIGHT KNEE: ICD-10-CM

## 2024-05-06 DIAGNOSIS — Z96.651 S/P TKR (TOTAL KNEE REPLACEMENT) USING CEMENT, RIGHT: Primary | ICD-10-CM

## 2024-05-06 PROCEDURE — 7100000010 HC PHASE TWO TIME - EACH INCREMENTAL 1 MINUTE: Performed by: ORTHOPAEDIC SURGERY

## 2024-05-06 PROCEDURE — 64447 NJX AA&/STRD FEMORAL NRV IMG: CPT | Performed by: STUDENT IN AN ORGANIZED HEALTH CARE EDUCATION/TRAINING PROGRAM

## 2024-05-06 PROCEDURE — 2500000004 HC RX 250 GENERAL PHARMACY W/ HCPCS (ALT 636 FOR OP/ED): Performed by: STUDENT IN AN ORGANIZED HEALTH CARE EDUCATION/TRAINING PROGRAM

## 2024-05-06 PROCEDURE — G0378 HOSPITAL OBSERVATION PER HR: HCPCS

## 2024-05-06 PROCEDURE — 2500000004 HC RX 250 GENERAL PHARMACY W/ HCPCS (ALT 636 FOR OP/ED): Performed by: PHYSICIAN ASSISTANT

## 2024-05-06 PROCEDURE — 2720000007 HC OR 272 NO HCPCS: Performed by: ORTHOPAEDIC SURGERY

## 2024-05-06 PROCEDURE — 27447 TOTAL KNEE ARTHROPLASTY: CPT | Performed by: PHYSICIAN ASSISTANT

## 2024-05-06 PROCEDURE — 3700000001 HC GENERAL ANESTHESIA TIME - INITIAL BASE CHARGE: Performed by: ORTHOPAEDIC SURGERY

## 2024-05-06 PROCEDURE — 3600000005 HC OR TIME - INITIAL BASE CHARGE - PROCEDURE LEVEL FIVE: Performed by: ORTHOPAEDIC SURGERY

## 2024-05-06 PROCEDURE — RXMED WILLOW AMBULATORY MEDICATION CHARGE

## 2024-05-06 PROCEDURE — 2500000005 HC RX 250 GENERAL PHARMACY W/O HCPCS: Performed by: ORTHOPAEDIC SURGERY

## 2024-05-06 PROCEDURE — 2780000003 HC OR 278 NO HCPCS: Performed by: ORTHOPAEDIC SURGERY

## 2024-05-06 PROCEDURE — 7100000001 HC RECOVERY ROOM TIME - INITIAL BASE CHARGE: Performed by: ORTHOPAEDIC SURGERY

## 2024-05-06 PROCEDURE — 2500000004 HC RX 250 GENERAL PHARMACY W/ HCPCS (ALT 636 FOR OP/ED): Performed by: ANESTHESIOLOGIST ASSISTANT

## 2024-05-06 PROCEDURE — 97110 THERAPEUTIC EXERCISES: CPT | Mod: GP

## 2024-05-06 PROCEDURE — 94760 N-INVAS EAR/PLS OXIMETRY 1: CPT

## 2024-05-06 PROCEDURE — 27447 TOTAL KNEE ARTHROPLASTY: CPT | Performed by: ORTHOPAEDIC SURGERY

## 2024-05-06 PROCEDURE — C1776 JOINT DEVICE (IMPLANTABLE): HCPCS | Performed by: ORTHOPAEDIC SURGERY

## 2024-05-06 PROCEDURE — A4649 SURGICAL SUPPLIES: HCPCS | Performed by: ORTHOPAEDIC SURGERY

## 2024-05-06 PROCEDURE — 97530 THERAPEUTIC ACTIVITIES: CPT | Mod: GP

## 2024-05-06 PROCEDURE — 97116 GAIT TRAINING THERAPY: CPT | Mod: GP

## 2024-05-06 PROCEDURE — 2500000005 HC RX 250 GENERAL PHARMACY W/O HCPCS: Performed by: ANESTHESIOLOGIST ASSISTANT

## 2024-05-06 PROCEDURE — 7100000009 HC PHASE TWO TIME - INITIAL BASE CHARGE: Performed by: ORTHOPAEDIC SURGERY

## 2024-05-06 PROCEDURE — 7100000011 HC EXTENDED STAY RECOVERY HOURLY - NURSING UNIT

## 2024-05-06 PROCEDURE — 7100000002 HC RECOVERY ROOM TIME - EACH INCREMENTAL 1 MINUTE: Performed by: ORTHOPAEDIC SURGERY

## 2024-05-06 PROCEDURE — A27447 PR TOTAL KNEE ARTHROPLASTY: Performed by: STUDENT IN AN ORGANIZED HEALTH CARE EDUCATION/TRAINING PROGRAM

## 2024-05-06 PROCEDURE — 2500000001 HC RX 250 WO HCPCS SELF ADMINISTERED DRUGS (ALT 637 FOR MEDICARE OP): Performed by: PHYSICIAN ASSISTANT

## 2024-05-06 PROCEDURE — 2500000005 HC RX 250 GENERAL PHARMACY W/O HCPCS: Performed by: PHYSICIAN ASSISTANT

## 2024-05-06 PROCEDURE — 97161 PT EVAL LOW COMPLEX 20 MIN: CPT | Mod: GP

## 2024-05-06 PROCEDURE — C1713 ANCHOR/SCREW BN/BN,TIS/BN: HCPCS | Performed by: ORTHOPAEDIC SURGERY

## 2024-05-06 PROCEDURE — 3700000002 HC GENERAL ANESTHESIA TIME - EACH INCREMENTAL 1 MINUTE: Performed by: ORTHOPAEDIC SURGERY

## 2024-05-06 PROCEDURE — A27447 PR TOTAL KNEE ARTHROPLASTY: Performed by: ANESTHESIOLOGIST ASSISTANT

## 2024-05-06 PROCEDURE — 3600000010 HC OR TIME - EACH INCREMENTAL 1 MINUTE - PROCEDURE LEVEL FIVE: Performed by: ORTHOPAEDIC SURGERY

## 2024-05-06 DEVICE — ATTUNE KNEE SYSTEM FEMORAL POSTERIOR STABILIZED SIZE 8 RIGHT CEMENTED
Type: IMPLANTABLE DEVICE | Site: KNEE | Status: FUNCTIONAL
Brand: ATTUNE

## 2024-05-06 DEVICE — ATTUNE PATELLA MEDIALIZED DOME 41MM CEMENTED AOX
Type: IMPLANTABLE DEVICE | Site: KNEE | Status: FUNCTIONAL
Brand: ATTUNE

## 2024-05-06 DEVICE — ATTUNE KNEE SYSTEM TIBIAL INSERT FIXED BEARING POSTERIOR STABILIZED 8 8MM AOX
Type: IMPLANTABLE DEVICE | Site: KNEE | Status: FUNCTIONAL
Brand: ATTUNE

## 2024-05-06 DEVICE — SMARTSET HV HIGH VISCOSITY BONE CEMENT 40G
Type: IMPLANTABLE DEVICE | Site: KNEE | Status: FUNCTIONAL
Brand: SMARTSET

## 2024-05-06 RX ORDER — ALBUTEROL SULFATE 0.83 MG/ML
2.5 SOLUTION RESPIRATORY (INHALATION) ONCE AS NEEDED
Status: DISCONTINUED | OUTPATIENT
Start: 2024-05-06 | End: 2024-05-06 | Stop reason: HOSPADM

## 2024-05-06 RX ORDER — CEFAZOLIN SODIUM 2 G/100ML
2 INJECTION, SOLUTION INTRAVENOUS EVERY 8 HOURS
Qty: 200 ML | Refills: 0 | Status: DISCONTINUED | OUTPATIENT
Start: 2024-05-06 | End: 2024-05-06 | Stop reason: HOSPADM

## 2024-05-06 RX ORDER — ACETAMINOPHEN 325 MG/1
650 TABLET ORAL EVERY 6 HOURS PRN
Status: DISCONTINUED | OUTPATIENT
Start: 2024-05-06 | End: 2024-05-06 | Stop reason: HOSPADM

## 2024-05-06 RX ORDER — ASPIRIN 81 MG/1
81 TABLET ORAL 2 TIMES DAILY
Status: DISCONTINUED | OUTPATIENT
Start: 2024-05-06 | End: 2024-05-06 | Stop reason: HOSPADM

## 2024-05-06 RX ORDER — PHENYLEPHRINE HCL IN 0.9% NACL 1 MG/10 ML
SYRINGE (ML) INTRAVENOUS AS NEEDED
Status: DISCONTINUED | OUTPATIENT
Start: 2024-05-06 | End: 2024-05-06

## 2024-05-06 RX ORDER — PREGABALIN 75 MG/1
75 CAPSULE ORAL ONCE
Status: COMPLETED | OUTPATIENT
Start: 2024-05-06 | End: 2024-05-06

## 2024-05-06 RX ORDER — ACETAMINOPHEN 325 MG/1
975 TABLET ORAL ONCE
Status: COMPLETED | OUTPATIENT
Start: 2024-05-06 | End: 2024-05-06

## 2024-05-06 RX ORDER — BISACODYL 5 MG
10 TABLET, DELAYED RELEASE (ENTERIC COATED) ORAL DAILY PRN
Status: DISCONTINUED | OUTPATIENT
Start: 2024-05-06 | End: 2024-05-06 | Stop reason: HOSPADM

## 2024-05-06 RX ORDER — OXYCODONE HYDROCHLORIDE 5 MG/1
5 TABLET ORAL EVERY 4 HOURS PRN
Status: DISCONTINUED | OUTPATIENT
Start: 2024-05-06 | End: 2024-05-06 | Stop reason: HOSPADM

## 2024-05-06 RX ORDER — BISACODYL 10 MG/1
10 SUPPOSITORY RECTAL DAILY PRN
Status: DISCONTINUED | OUTPATIENT
Start: 2024-05-06 | End: 2024-05-06 | Stop reason: HOSPADM

## 2024-05-06 RX ORDER — OXYCODONE HYDROCHLORIDE 5 MG/1
10 TABLET ORAL EVERY 4 HOURS PRN
Status: DISCONTINUED | OUTPATIENT
Start: 2024-05-06 | End: 2024-05-06 | Stop reason: HOSPADM

## 2024-05-06 RX ORDER — ONDANSETRON 4 MG/1
4 TABLET, ORALLY DISINTEGRATING ORAL EVERY 8 HOURS PRN
Status: DISCONTINUED | OUTPATIENT
Start: 2024-05-06 | End: 2024-05-06 | Stop reason: HOSPADM

## 2024-05-06 RX ORDER — DOCUSATE SODIUM 100 MG/1
100 CAPSULE, LIQUID FILLED ORAL 2 TIMES DAILY
Status: DISCONTINUED | OUTPATIENT
Start: 2024-05-06 | End: 2024-05-06 | Stop reason: HOSPADM

## 2024-05-06 RX ORDER — METOCLOPRAMIDE HYDROCHLORIDE 5 MG/ML
10 INJECTION INTRAMUSCULAR; INTRAVENOUS EVERY 6 HOURS PRN
Status: DISCONTINUED | OUTPATIENT
Start: 2024-05-06 | End: 2024-05-06 | Stop reason: HOSPADM

## 2024-05-06 RX ORDER — NALOXONE HYDROCHLORIDE 0.4 MG/ML
0.2 INJECTION, SOLUTION INTRAMUSCULAR; INTRAVENOUS; SUBCUTANEOUS EVERY 5 MIN PRN
Status: DISCONTINUED | OUTPATIENT
Start: 2024-05-06 | End: 2024-05-06 | Stop reason: HOSPADM

## 2024-05-06 RX ORDER — GLYCOPYRROLATE 0.2 MG/ML
INJECTION INTRAMUSCULAR; INTRAVENOUS AS NEEDED
Status: DISCONTINUED | OUTPATIENT
Start: 2024-05-06 | End: 2024-05-06

## 2024-05-06 RX ORDER — PROPOFOL 10 MG/ML
INJECTION, EMULSION INTRAVENOUS AS NEEDED
Status: DISCONTINUED | OUTPATIENT
Start: 2024-05-06 | End: 2024-05-06

## 2024-05-06 RX ORDER — SODIUM CHLORIDE, SODIUM LACTATE, POTASSIUM CHLORIDE, CALCIUM CHLORIDE 600; 310; 30; 20 MG/100ML; MG/100ML; MG/100ML; MG/100ML
75 INJECTION, SOLUTION INTRAVENOUS CONTINUOUS
Status: DISCONTINUED | OUTPATIENT
Start: 2024-05-06 | End: 2024-05-06 | Stop reason: HOSPADM

## 2024-05-06 RX ORDER — LIDOCAINE HYDROCHLORIDE 10 MG/ML
INJECTION, SOLUTION EPIDURAL; INFILTRATION; INTRACAUDAL; PERINEURAL AS NEEDED
Status: DISCONTINUED | OUTPATIENT
Start: 2024-05-06 | End: 2024-05-06

## 2024-05-06 RX ORDER — ACETAMINOPHEN 325 MG/1
650 TABLET ORAL EVERY 4 HOURS PRN
Status: DISCONTINUED | OUTPATIENT
Start: 2024-05-06 | End: 2024-05-06 | Stop reason: HOSPADM

## 2024-05-06 RX ORDER — ONDANSETRON HYDROCHLORIDE 2 MG/ML
INJECTION, SOLUTION INTRAVENOUS AS NEEDED
Status: DISCONTINUED | OUTPATIENT
Start: 2024-05-06 | End: 2024-05-06

## 2024-05-06 RX ORDER — MELOXICAM 7.5 MG/1
7.5 TABLET ORAL ONCE
Status: COMPLETED | OUTPATIENT
Start: 2024-05-06 | End: 2024-05-06

## 2024-05-06 RX ORDER — SCOLOPAMINE TRANSDERMAL SYSTEM 1 MG/1
1 PATCH, EXTENDED RELEASE TRANSDERMAL
Status: DISCONTINUED | OUTPATIENT
Start: 2024-05-06 | End: 2024-05-06 | Stop reason: HOSPADM

## 2024-05-06 RX ORDER — CEFAZOLIN SODIUM 2 G/100ML
2 INJECTION, SOLUTION INTRAVENOUS ONCE
Status: COMPLETED | OUTPATIENT
Start: 2024-05-06 | End: 2024-05-06

## 2024-05-06 RX ORDER — MIDAZOLAM HYDROCHLORIDE 1 MG/ML
2 INJECTION, SOLUTION INTRAMUSCULAR; INTRAVENOUS ONCE
Status: COMPLETED | OUTPATIENT
Start: 2024-05-06 | End: 2024-05-06

## 2024-05-06 RX ORDER — BUPIVACAINE HYDROCHLORIDE 7.5 MG/ML
INJECTION, SOLUTION INTRASPINAL AS NEEDED
Status: DISCONTINUED | OUTPATIENT
Start: 2024-05-06 | End: 2024-05-06

## 2024-05-06 RX ORDER — SODIUM CHLORIDE, SODIUM LACTATE, POTASSIUM CHLORIDE, CALCIUM CHLORIDE 600; 310; 30; 20 MG/100ML; MG/100ML; MG/100ML; MG/100ML
100 INJECTION, SOLUTION INTRAVENOUS CONTINUOUS
Status: DISCONTINUED | OUTPATIENT
Start: 2024-05-06 | End: 2024-05-06 | Stop reason: HOSPADM

## 2024-05-06 RX ORDER — SODIUM CHLORIDE, SODIUM LACTATE, POTASSIUM CHLORIDE, CALCIUM CHLORIDE 600; 310; 30; 20 MG/100ML; MG/100ML; MG/100ML; MG/100ML
50 INJECTION, SOLUTION INTRAVENOUS CONTINUOUS
Status: DISCONTINUED | OUTPATIENT
Start: 2024-05-06 | End: 2024-05-06 | Stop reason: HOSPADM

## 2024-05-06 RX ORDER — KETOROLAC TROMETHAMINE 15 MG/ML
15 INJECTION, SOLUTION INTRAMUSCULAR; INTRAVENOUS EVERY 6 HOURS
Qty: 4 ML | Refills: 0 | Status: DISCONTINUED | OUTPATIENT
Start: 2024-05-06 | End: 2024-05-06 | Stop reason: HOSPADM

## 2024-05-06 RX ORDER — CYCLOBENZAPRINE HCL 10 MG
5 TABLET ORAL 3 TIMES DAILY PRN
Status: DISCONTINUED | OUTPATIENT
Start: 2024-05-06 | End: 2024-05-06 | Stop reason: HOSPADM

## 2024-05-06 RX ORDER — METOCLOPRAMIDE 10 MG/1
10 TABLET ORAL EVERY 6 HOURS PRN
Status: DISCONTINUED | OUTPATIENT
Start: 2024-05-06 | End: 2024-05-06 | Stop reason: HOSPADM

## 2024-05-06 RX ORDER — OXYCODONE HCL 10 MG/1
10 TABLET, FILM COATED, EXTENDED RELEASE ORAL ONCE
Status: COMPLETED | OUTPATIENT
Start: 2024-05-06 | End: 2024-05-06

## 2024-05-06 RX ORDER — PANTOPRAZOLE SODIUM 40 MG/1
40 TABLET, DELAYED RELEASE ORAL
Status: DISCONTINUED | OUTPATIENT
Start: 2024-05-07 | End: 2024-05-06 | Stop reason: HOSPADM

## 2024-05-06 RX ORDER — HYDROMORPHONE HYDROCHLORIDE 1 MG/ML
1 INJECTION, SOLUTION INTRAMUSCULAR; INTRAVENOUS; SUBCUTANEOUS EVERY 2 HOUR PRN
Status: DISCONTINUED | OUTPATIENT
Start: 2024-05-06 | End: 2024-05-06 | Stop reason: HOSPADM

## 2024-05-06 RX ORDER — ONDANSETRON HYDROCHLORIDE 2 MG/ML
4 INJECTION, SOLUTION INTRAVENOUS ONCE AS NEEDED
Status: DISCONTINUED | OUTPATIENT
Start: 2024-05-06 | End: 2024-05-06 | Stop reason: HOSPADM

## 2024-05-06 RX ORDER — DIPHENHYDRAMINE HYDROCHLORIDE 50 MG/ML
12.5 INJECTION INTRAMUSCULAR; INTRAVENOUS EVERY 6 HOURS PRN
Status: DISCONTINUED | OUTPATIENT
Start: 2024-05-06 | End: 2024-05-06 | Stop reason: HOSPADM

## 2024-05-06 RX ORDER — ONDANSETRON HYDROCHLORIDE 2 MG/ML
4 INJECTION, SOLUTION INTRAVENOUS EVERY 8 HOURS PRN
Status: DISCONTINUED | OUTPATIENT
Start: 2024-05-06 | End: 2024-05-06 | Stop reason: HOSPADM

## 2024-05-06 RX ORDER — TRANEXAMIC ACID 100 MG/ML
INJECTION, SOLUTION INTRAVENOUS AS NEEDED
Status: DISCONTINUED | OUTPATIENT
Start: 2024-05-06 | End: 2024-05-06

## 2024-05-06 RX ORDER — FENTANYL CITRATE 50 UG/ML
50 INJECTION, SOLUTION INTRAMUSCULAR; INTRAVENOUS ONCE
Status: COMPLETED | OUTPATIENT
Start: 2024-05-06 | End: 2024-05-06

## 2024-05-06 RX ORDER — ROPIVACAINE/EPI/CLONIDINE/KET 2.46-0.005
SYRINGE (ML) INJECTION AS NEEDED
Status: DISCONTINUED | OUTPATIENT
Start: 2024-05-06 | End: 2024-05-06 | Stop reason: HOSPADM

## 2024-05-06 RX ORDER — POLYETHYLENE GLYCOL 3350 17 G/17G
17 POWDER, FOR SOLUTION ORAL DAILY
Status: DISCONTINUED | OUTPATIENT
Start: 2024-05-06 | End: 2024-05-06 | Stop reason: HOSPADM

## 2024-05-06 RX ADMIN — SODIUM CHLORIDE, SODIUM LACTATE, POTASSIUM CHLORIDE, AND CALCIUM CHLORIDE: 600; 310; 30; 20 INJECTION, SOLUTION INTRAVENOUS at 08:17

## 2024-05-06 RX ADMIN — ACETAMINOPHEN 975 MG: 325 TABLET ORAL at 06:06

## 2024-05-06 RX ADMIN — BUPIVACAINE HYDROCHLORIDE IN DEXTROSE 2 ML: 7.5 INJECTION, SOLUTION SUBARACHNOID at 07:09

## 2024-05-06 RX ADMIN — ONDANSETRON 4 MG: 2 INJECTION INTRAMUSCULAR; INTRAVENOUS at 08:42

## 2024-05-06 RX ADMIN — LIDOCAINE HYDROCHLORIDE 5 ML: 10 INJECTION, SOLUTION EPIDURAL; INFILTRATION; INTRACAUDAL; PERINEURAL at 07:29

## 2024-05-06 RX ADMIN — CEFAZOLIN SODIUM 2 G: 2 INJECTION, SOLUTION INTRAVENOUS at 07:10

## 2024-05-06 RX ADMIN — PROPOFOL 50 MG: 10 INJECTION, EMULSION INTRAVENOUS at 07:16

## 2024-05-06 RX ADMIN — GLYCOPYRROLATE 0.4 MG: 0.2 INJECTION INTRAMUSCULAR; INTRAVENOUS at 07:59

## 2024-05-06 RX ADMIN — MIDAZOLAM HYDROCHLORIDE 2 MG: 1 INJECTION, SOLUTION INTRAMUSCULAR; INTRAVENOUS at 06:47

## 2024-05-06 RX ADMIN — SODIUM CHLORIDE, SODIUM LACTATE, POTASSIUM CHLORIDE, AND CALCIUM CHLORIDE 75 ML/HR: 600; 310; 30; 20 INJECTION, SOLUTION INTRAVENOUS at 06:06

## 2024-05-06 RX ADMIN — OXYCODONE HYDROCHLORIDE 10 MG: 10 TABLET, FILM COATED, EXTENDED RELEASE ORAL at 06:44

## 2024-05-06 RX ADMIN — PROPOFOL 500 MG: 10 INJECTION, EMULSION INTRAVENOUS at 08:25

## 2024-05-06 RX ADMIN — PROPOFOL 500 MG: 10 INJECTION, EMULSION INTRAVENOUS at 07:10

## 2024-05-06 RX ADMIN — POVIDONE-IODINE 1 APPLICATION: 5 SOLUTION TOPICAL at 06:06

## 2024-05-06 RX ADMIN — Medication 2 L/MIN: at 10:15

## 2024-05-06 RX ADMIN — Medication 100 MCG: at 08:52

## 2024-05-06 RX ADMIN — PROPOFOL 50 MG: 10 INJECTION, EMULSION INTRAVENOUS at 07:20

## 2024-05-06 RX ADMIN — DEXAMETHASONE SODIUM PHOSPHATE 4 MG: 4 INJECTION, SOLUTION INTRAMUSCULAR; INTRAVENOUS at 08:42

## 2024-05-06 RX ADMIN — PREGABALIN 75 MG: 75 CAPSULE ORAL at 06:06

## 2024-05-06 RX ADMIN — TRANEXAMIC ACID 1000 MG: 1 INJECTION, SOLUTION INTRAVENOUS at 07:12

## 2024-05-06 RX ADMIN — Medication 100 MCG: at 08:14

## 2024-05-06 RX ADMIN — FENTANYL CITRATE 50 MCG: 50 INJECTION INTRAMUSCULAR; INTRAVENOUS at 06:47

## 2024-05-06 RX ADMIN — GLYCOPYRROLATE 0.2 MG: 0.2 INJECTION INTRAMUSCULAR; INTRAVENOUS at 07:30

## 2024-05-06 RX ADMIN — Medication 100 MCG: at 07:58

## 2024-05-06 RX ADMIN — KETOROLAC TROMETHAMINE 15 MG: 15 INJECTION, SOLUTION INTRAMUSCULAR; INTRAVENOUS at 12:46

## 2024-05-06 RX ADMIN — SCOPALAMINE 1 PATCH: 1 PATCH, EXTENDED RELEASE TRANSDERMAL at 06:06

## 2024-05-06 RX ADMIN — MELOXICAM 7.5 MG: 7.5 TABLET ORAL at 06:06

## 2024-05-06 RX ADMIN — PROPOFOL 50 MG: 10 INJECTION, EMULSION INTRAVENOUS at 07:12

## 2024-05-06 RX ADMIN — TRANEXAMIC ACID 1000 MG: 1 INJECTION, SOLUTION INTRAVENOUS at 08:18

## 2024-05-06 ASSESSMENT — COGNITIVE AND FUNCTIONAL STATUS - GENERAL
CLIMB 3 TO 5 STEPS WITH RAILING: A LITTLE
STANDING UP FROM CHAIR USING ARMS: A LITTLE
MOBILITY SCORE: 22

## 2024-05-06 ASSESSMENT — PAIN - FUNCTIONAL ASSESSMENT
PAIN_FUNCTIONAL_ASSESSMENT: 0-10

## 2024-05-06 ASSESSMENT — PAIN DESCRIPTION - DESCRIPTORS: DESCRIPTORS: DULL

## 2024-05-06 ASSESSMENT — COLUMBIA-SUICIDE SEVERITY RATING SCALE - C-SSRS
6. HAVE YOU EVER DONE ANYTHING, STARTED TO DO ANYTHING, OR PREPARED TO DO ANYTHING TO END YOUR LIFE?: NO
1. IN THE PAST MONTH, HAVE YOU WISHED YOU WERE DEAD OR WISHED YOU COULD GO TO SLEEP AND NOT WAKE UP?: NO
2. HAVE YOU ACTUALLY HAD ANY THOUGHTS OF KILLING YOURSELF?: NO

## 2024-05-06 ASSESSMENT — PAIN SCALES - GENERAL
PAINLEVEL_OUTOF10: 2
PAINLEVEL_OUTOF10: 0 - NO PAIN
PAINLEVEL_OUTOF10: 3
PAINLEVEL_OUTOF10: 0 - NO PAIN
PAINLEVEL_OUTOF10: 5 - MODERATE PAIN

## 2024-05-06 ASSESSMENT — ACTIVITIES OF DAILY LIVING (ADL): ADL_ASSISTANCE: INDEPENDENT

## 2024-05-06 NOTE — PROGRESS NOTES
Met with Patient and Care Partner at bedside- Patient is s/p Right Total Knee Replacement with Dr. Christian Loving.  Discussion with patient included education on the following topics: [facility] Education: Wound Care, Post-Op Activity, Post-Op Precautions, Cold-Therapy, Importance of post-op prescriptions, When to call the Surgeon's Office, Use of MyChart, and When to call 9-1-1.  Patient Did Complete and Live class prior to surgery.  Patient is able to verbalize understanding of class content/discussion.  Contact information was provided to patient for support and assistance during the post-operative period.     PT Location sheet given to care partner, instructed to anticipate first visit around 3 weeks post-op.

## 2024-05-06 NOTE — ANESTHESIA PROCEDURE NOTES
Peripheral Block    Patient location during procedure: pre-op  Start time: 5/6/2024 6:52 AM  End time: 5/6/2024 6:53 AM  Reason for block: at surgeon's request and post-op pain management  Staffing  Performed: attending   Authorized by: Jaylene Hubbard MD    Performed by: Jaylene Hubbard MD  Preanesthetic Checklist  Completed: patient identified, IV checked, site marked, risks and benefits discussed, surgical consent, monitors and equipment checked, pre-op evaluation and timeout performed   Timeout performed at: 5/6/2024 6:46 AM  Peripheral Block  Patient position: laying flat  Prep: ChloraPrep  Patient monitoring: heart rate, continuous pulse ox and cardiac monitor  Block type: adductor canal  Laterality: right  Injection technique: single-shot  Guidance: ultrasound guided  Local infiltration: ropivacaine  Infiltration strength: 0.5 %  Dose: 20 mL  Needle  Needle gauge: 21 G  Needle length: 10 cm  Needle localization: ultrasound guidance     image stored in chart  Assessment  Injection assessment: incremental injection, negative aspiration for heme, local visualized surrounding nerve on ultrasound and no paresthesia on injection  Paresthesia pain: none  Heart rate change: no  Slow fractionated injection: yes

## 2024-05-06 NOTE — PROGRESS NOTES
Physical Therapy Evaluation & Treatment    Patient Name: Toby Olae  MRN: [id number]  Today's Date: 5/6/2024   Time Calculation  Start Time: 1327  Stop Time: 1428  Time Calculation (min): 61 min    Assessment/Plan   PT Assessment  PT Assessment Results: Decreased strength, Decreased range of motion, Decreased mobility, Decreased safety awareness, Orthopedic restrictions, Pain  Rehab Prognosis: Excellent  Evaluation/Treatment Tolerance: Patient tolerated treatment well  Strengths: Ability to acquire knowledge, Attitude of self, Insight into problems, Physical health, Premorbid level of function, Support of Caregivers, Support and attitude of living partners  End of Session Communication: Bedside nurse  Assessment Comment: Pt low complexity eval presenting with increased pain and deficits to functional mobility following R TKA performed on 5/6/2024. Education regarding TKA precautions provided; pt verbalized understanding. Therapeutic exercises performed; HEP handout provided. Pt mildly impulsive throughout session required repeated verbal cues from PT to ensure safe performance of mobility. Pt is highly motivated to return to premorbid level of function and will have assist from family at home upon DC. PT recommends DC home with 24/7 supervision and HHPT.    End of Session Patient Position: On cart with ice to surgical site. Hemovac drain to R knee intact draining to gravity. Call light left in reach; patient instructed not to get up on own and verbalized understanding of this. RN aware and present with pt at time of PT exit.    IP OR SWING BED PT PLAN  Inpatient or Swing Bed: Inpatient  PT Plan  Treatment/Interventions: Bed mobility, Transfer training, Gait training, Stair training, Strengthening, Range of motion, Therapeutic exercise, Therapeutic activity, Home exercise program, Positioning  PT Plan: Skilled PT  PT Frequency: BID  PT Discharge Recommendations: Low intensity level of continued care  Equipment  Recommended upon Discharge: Wheeled walker, Straight cane (PT encouraged pt and wife to purchase straight cane prior to arriving home to negotiate stairs; both verbalized understanding)  PT Recommended Transfer Status: Stand by assist, Assistive device  PT - OK to Discharge: Yes      Subjective     General Visit Information:  General  Reason for Referral: R TKA (5/6/2024)  Referred By: Dr. Loving  Past Medical History Relevant to Rehab: OA, HTN; knee scope w/ meniscal repair; ETOH  Family/Caregiver Present: Yes (wife Dinah)  Prior to Session Communication: Bedside nurse  Patient Position Received: On cart  General Comment: Session cleared by RN. Pt very pleasant and agreeable to PT evaluation. Dressing to R knee dry & intact. Hemovac drain to R knee intact draining to gravity.    Home Living:  Home Living  Type of Home: House  Lives With: Spouse, Adult children  Home Adaptive Equipment: Walker rolling or standard  Home Layout: Two level, Stairs to alternate level with rails  Alternate Level Stairs-Rails: Left  Alternate Level Stairs-Number of Steps: 13  Home Access: Stairs to enter without rails  Entrance Stairs-Number of Steps: 2  Bathroom Shower/Tub: Walk-in shower  Bathroom Equipment: Built-in shower seat  Prior Level of Function:  Prior Function Per Pt/Caregiver Report  Level of [place]: Independent with ADLs and functional transfers, Independent with homemaking with ambulation  ADL Assistance: Independent  Homemaking Assistance: Independent  Ambulatory Assistance: Independent  Vocational: Full time employment, Works at home  Leisure: working out  Prior Function Comments: Pt denies falls prior to admission  Precautions:  Precautions  LE Weight Bearing Status: Weight Bearing as Tolerated  Medical Precautions: Fall precautions  Post-Surgical Precautions: Right total knee precautions    Objective   Pain:  Pain Assessment  Pain Assessment: 0-10  Pain Score: 3  Pain Type: Surgical pain  Pain Location:  Knee  Pain Orientation: Right  Pain Interventions: Cold applied, Ambulation/increased activity  Cognition:  Cognition  Overall Cognitive Status: Within Functional Limits  Attention: Within Functional Limits  Memory: Within Funtional Limits  Problem Solving: Within Functional Limits  Safety/Judgement: Exceptions to WFL  Unable to Self-Monitor and Self-Correct Consistently: Moderate (pt unable to complete STS transfers safely without verbal cues from PT for hand placement; PT provided pt education on importance of proper body mechanics during transfers to ensure safe mobility; pt verbalized understanding.)  Insight: Within function limits  Impulsive: Mildly    General Assessments:  Activity Tolerance  Endurance: Endurance does not limit participation in activity    Sensation  Light Touch: No apparent deficits  Proprioception: No apparent deficits    Coordination  Movements are Fluid and Coordinated: Yes    Postural Control  Postural Control: Within Functional Limits    Static Sitting Balance  Static Sitting-Balance Support: Feet supported, Bilateral upper extremity supported  Static Sitting-Level of Assistance: Independent  Dynamic Sitting Balance  Dynamic Sitting-Balance Support: Feet supported  Dynamic Sitting-Balance: Forward lean  Dynamic Sitting-Comments: independent seated EOB    Static Standing Balance  Static Standing-Balance Support: No upper extremity supported, Bilateral upper extremity supported  Static Standing-Level of Assistance: Close supervision  Static Standing-Comment/Number of Minutes: pt stood at toilet and EOB with walker to attempt to urinate for ~10 minutes during session without BUE support; no knee buckle/LOB or increased postural sway noted  Dynamic Standing Balance  Dynamic Standing-Balance Support: Bilateral upper extremity supported  Dynamic Standing-Comments: close supervision with rolling walker + gait belt  Functional Assessments:  Bed Mobility  Bed Mobility: Yes  Bed Mobility 1  Bed  "Mobility 1: Supine to sitting, Sitting to supine  Level of Assistance 1: Independent    Transfers  Transfer: Yes  Transfer 1  Transfer From 1: Bed to  Transfer to 1: Stand  Technique 1: Sit to stand, Stand to sit  Transfer Device 1: Walker, Gait belt  Transfer Level of Assistance 1: Close supervision, Moderate verbal cues (repeated cues required for safe body mechanics and hand placement)  Trials/Comments 1: x3  Transfers 2  Transfer From 2: Stand to  Transfer to 2: Wheelchair  Technique 2: Stand to sit, Sit to stand  Transfer Device 2: Walker (+gait belt)  Transfer Level of Assistance 2: Minimum assistance, Moderate verbal cues (repeated cues for body mechanics and hand placement; pt demonstrated understanding 50% of time)  Trials/Comments 2: x3 pt required increased assist from PT d/t transferring to lower surface, pt demonstrated decreased eccentric control    Ambulation/Gait Training  Ambulation/Gait Training Performed: Yes  Ambulation/Gait Training 1  Surface 1: Level tile  Device 1: Rolling walker  Gait Support Devices: Gait belt  Assistance 1: Close supervision  Quality of Gait 1: Decreased step length, Antalgic (decreased sam; reciprocal pattern)  Comments/Distance (ft) 1: 30 feet x2    Stairs  Stairs: Yes  Stairs  Rails 1: Bilateral  Device 1: Railing  Support Devices 1: Gait belt  Assistance 1: Close supervision  Comment/Number of Steps 1: three 6\" steps completed via step to pattern  Stairs 2  Rails 2: Left  Device 2: Railing  Support Devices 2: Gait belt  Assistance 2: Close supervision, Minimal verbal cues (cues for sequencing)  Comment/Number of Steps 2: three 6\" steps completed via step to pattern  Stairs 3  Rails 3: None (Comment) (L cane + R HHA)  Device 3: Single point cane  Support Devices 3: Gait belt  Assistance 3: Hand held assistance, Minimal verbal cues (cues for sequencing, body mechanics, and hand placement)  Comment/Number of Stairs 3: three 6\" steps completed via step to pattern; PT " provided pt education to pt and wife on proper guarding and body mechanics for both pt and caregiver; both verbalized understanding  Extremity/Trunk Assessments:  RUE   RUE : Within Functional Limits  LUE   LUE: Within Functional Limits  RLE   RLE : Exceptions to WFL  AROM RLE (degrees)  RLE AROM Comment: R knee flexion >90 degrees; full knee extension in supine and standing  Strength RLE  RLE Overall Strength: Greater than or equal to 3/5 as evidenced by functional mobility  LLE   LLE : Within Functional Limits  Treatments:  Therapeutic Exercise  Therapeutic Exercise Performed: Yes B ankle pumps, R quad sets, R gluteal sets, R heel slides, R SAQ, R hip abduction, and R SLR x 10 reps each.      Therapeutic Activity  Therapeutic Activity Performed: Yes  Therapeutic Activity 1: PT provided pt education on icing/elevating surgical limb, compression stocking use, ther ex, body mechanics and sequencing during functional mobility, use and proper fit of assistive devices; pt and wife both verbalized understanding.  Outcome Measures:  [facility] Basic Mobility  Turning from your back to your side while in a flat bed without using bedrails: None  Moving from lying on your back to sitting on the side of a flat bed without using bedrails: None  Moving to and from bed to chair (including a wheelchair): None  Standing up from a chair using your arms (e.g. wheelchair or bedside chair): A little  To walk in hospital room: None  Climbing 3-5 steps with railing: A little  Basic Mobility - Total Score: 22    Encounter Problems       Encounter Problems (Active)       Mobility       LTG - Patient will ambulate household distance with rolling walker at distant supervision (Progressing)       Start:  05/06/24            LTG - Patient will navigate 2 steps with L cane + R HHA with close supervision (Progressing)       Start:  05/06/24               PT Transfers       LTG - Patient will demonstrate safe transfer techniques with rolling walker  at distant supervision (Progressing)       Start:  05/06/24               Pain          Safety       LTG - Patient will demonstrate safety requirements appropriate to situation/environment (Progressing)       Start:  05/06/24                  Encounter Problems (Resolved)       Balance       LTG - Patient will maintain standing and sitting balance to allow for completion of daily activities (Met)       Start:  05/06/24    Resolved:  05/06/24            Compromised Skin Integrity       LTG - Patient will be free from infection (Met)       Start:  05/06/24    Resolved:  05/06/24                Education Documentation  Handouts, taught by Dinora Trujillo PT at 5/6/2024  3:13 PM.  Learner: Significant Other, Patient  Readiness: Eager  Method: Explanation, Demonstration, Handout  Response: Verbalizes Understanding, Demonstrated Understanding    Precautions, taught by Dinora Trujillo PT at 5/6/2024  3:13 PM.  Learner: Significant Other, Patient  Readiness: Eager  Method: Explanation, Demonstration, Handout  Response: Verbalizes Understanding, Demonstrated Understanding    Body Mechanics, taught by Dinora Trujillo PT at 5/6/2024  3:13 PM.  Learner: Significant Other, Patient  Readiness: Eager  Method: Explanation, Demonstration, Handout  Response: Verbalizes Understanding, Demonstrated Understanding    Home Exercise Program, taught by Dinora Trujillo PT at 5/6/2024  3:13 PM.  Learner: Significant Other, Patient  Readiness: Eager  Method: Explanation, Demonstration, Handout  Response: Verbalizes Understanding, Demonstrated Understanding    Mobility Training, taught by Dinora Trujillo PT at 5/6/2024  3:13 PM.  Learner: Significant Other, Patient  Readiness: Eager  Method: Explanation, Demonstration, Handout  Response: Verbalizes Understanding, Demonstrated Understanding          Dinora Trujillo PT, DPT

## 2024-05-06 NOTE — CARE PLAN
RN TCC met with patient at the bedside to discuss discharge plan.   69 yr old female admitted RR following left knee replacement with Dr. Loving  Plan is home today with [facility] PT.  SOC confirmed for 5/7/2024.  Spouse to transport home and assist with care as needed.  There are 2 steps to enter, 13 steps up to the 2nd level, and 1 step down into the family room.  Confirmed post follow up on June 20, 2024 at 9:40 am-[place].

## 2024-05-06 NOTE — ANESTHESIA POSTPROCEDURE EVALUATION
Patient: Toby Olea    Procedure Summary       Date: 05/06/24 Room / Location: [facility] / [facility]    Anesthesia Start: 0656 Anesthesia Stop: 0904    Procedure: Knee Replacement Total Cement Unilat ( DePuy Attune Knee, Pineapple [place] ) Rapid Recovery (Right: Knee) Diagnosis:       Unilateral primary osteoarthritis, right knee      (Unilateral primary osteoarthritis, right knee [M17.11])    Surgeons: Christian Loving MD Responsible Provider: Jaylene Hubbard MD    Anesthesia Type: regional, spinal ASA Status: 3            Anesthesia Type: regional, spinal    Vitals Value Taken Time   /74 05/06/24 0930   Temp 36.1 °C (97 °F) 05/06/24 0858   Pulse 63 05/06/24 0930   Resp 13 05/06/24 0930   SpO2 92 % 05/06/24 0930       Anesthesia Post Evaluation    Patient location during evaluation: bedside  Patient participation: complete - patient participated  Level of consciousness: awake and alert  Pain management: adequate  Multimodal analgesia pain management approach  Airway patency: patent  Cardiovascular status: acceptable  Respiratory status: acceptable  Hydration status: acceptable  Postoperative Nausea and Vomiting: none        There were no known notable events for this encounter.

## 2024-05-06 NOTE — CARE PLAN
Problem: Mobility  Goal: LTG - Patient will ambulate household distance with rolling walker at distant supervision  Outcome: Progressing  Goal: LTG - Patient will navigate 2 steps with L cane + R HHA with close supervision  Outcome: Progressing     Problem: Safety  Goal: LTG - Patient will demonstrate safety requirements appropriate to situation/environment  Outcome: Progressing     Problem: PT Transfers  Goal: LTG - Patient will demonstrate safe transfer techniques with rolling walker at distant supervision  Outcome: Progressing     Problem: Balance  Goal: LTG - Patient will maintain standing and sitting balance to allow for completion of daily activities  Outcome: Met     Problem: Pain  Goal: LTG - Patient will manage pain with the appropriate technique/Intervention  Outcome: Met     Problem: Compromised Skin Integrity  Goal: LTG - Patient will be free from infection  Outcome: Met

## 2024-05-06 NOTE — PERIOPERATIVE NURSING NOTE
Patient admitted to phase II, room preop 7. Pt. Oriented to room and surroundings. Pt. Encouraged to order food when ready. Pt. Denies pain. Icemachine on operative knee. Wife at bedside.

## 2024-05-06 NOTE — ANESTHESIA PREPROCEDURE EVALUATION
Patient: Toby Olea    Procedure Information       Date/Time: 05/06/24 0700    Procedure: Knee Replacement Total Cement Unilat ( DePuy Attune Knee, Pineapple [place] ) Rapid Recovery (Right: Knee) - DePuy Attune Knee, Pineapple [place]    Location: [facility] / [facility]    Surgeons: Christian Loving MD            Relevant Problems   Anesthesia (within normal limits)      Cardiac   (+) Benign essential hypertension   (+) Hyperlipidemia      Pulmonary (within normal limits)      Neuro (within normal limits)      GI (within normal limits)      /Renal (within normal limits)      Liver (within normal limits)      Endocrine (within normal limits)      Hematology (within normal limits)      Musculoskeletal   (+) Localized osteoarthritis of right knee   (+) Unilateral primary osteoarthritis, right knee      HEENT (within normal limits)      ID (within normal limits)      Skin (within normal limits)      GYN (within normal limits)       Clinical information reviewed:   Tobacco  Allergies  Meds   Med Hx  Surg Hx   Fam Hx  Soc Hx        NPO Detail:  NPO/Void Status  Carbohydrate Drink Given Prior to Surgery? : N  Date of Last Liquid: 05/05/24  Time of Last Liquid: 1930  Date of Last Solid: 05/05/24  Time of Last Solid: 1930  Last Intake Type: Clear fluids  Time of Last Void: 0415         Physical Exam    Airway  Mallampati: I  TM distance: >3 FB  Neck ROM: full     Cardiovascular   Rhythm: regular  Rate: normal     Dental    Pulmonary   Breath sounds clear to auscultation     Abdominal            Anesthesia Plan    History of general anesthesia?: yes  History of complications of general anesthesia?: no    ASA 3     regional and spinal     intravenous induction   Postoperative administration of opioids is intended.  Anesthetic plan and risks discussed with patient.  Use of blood products discussed with patient who.

## 2024-05-06 NOTE — PROGRESS NOTES
Medication Education     Medication education for Toby Olea was provided to the patient and family for the following medication(s):  Aspirin  Docusate  Meloxicam  Oxycodone  Tylenol  Pantoprazole  Miralax  Tramadol    Medication education provided by a Pharmacist:  -Proper dose, indication, possible ADRs   -How the medication works and benefits of taking it  -Importance of compliance   -Potential duration of therapy    Identified potential barriers to education:  None    Method(s) of Education:  Verbal Written materials provided and reviewed    An opportunity to ask questions and receive answers was provided.     Assessment of understanding the patient and family:  2= meets goals/outcomes    Additional Notes (if applicable): Meds to beds given to patient and family.    Micheline Charles, PharmD

## 2024-05-06 NOTE — PERIOPERATIVE NURSING NOTE
Patient and wife given discharge instructions. Both state understanding and have had all questions answered. SHANNAN bradley'jonathan. Pt. Accompanied to car via wheelchair.

## 2024-05-06 NOTE — HH CARE COORDINATION
Home Care received a Referral for Physical Therapy. We have processed the referral for a Start of Care 24 HOURS     If you have any questions or concerns, please feel free to contact us at [phone]. Follow the prompts, enter your five digit zip code, and you will be directed to your care team on [facility].

## 2024-05-06 NOTE — PERIOPERATIVE NURSING NOTE
Patient has eaten 100% of meal with no c/o n/v. PT informed of improved movement. Pain is tolerable.

## 2024-05-06 NOTE — OP NOTE
Knee Replacement Total Cement Unilat ( DePuy Attune Knee, [place] ) Rapid Recovery (R) Operative Note     Date: 2024  OR Location: [facility]    Name: Toby Olea : 1960, Age: 63 y.o., MRN: [id number], Sex: male    Diagnosis  Pre-op Diagnosis     * Unilateral primary osteoarthritis, right knee [M17.11] Post-op Diagnosis     * Unilateral primary osteoarthritis, right knee [M17.11]     Procedures  Knee Replacement Total Cement Unilat ( DePuy Attune Knee, [place] ) Rapid Recovery  63825 - OR ARTHRP KNE CONDYLE&PLATU MEDIAL&LAT COMPARTMENTS      Surgeons      * Christian Loving - Primary    Resident/Fellow/Other Assistant:  Surgeons and Role:  * No surgeons found with a matching role *    Procedure Summary  Anesthesia: Consult  ASA: III  Anesthesia Staff: Anesthesiologist: Jaylene Hubbard MD  C-AA: JALEESA Villaseñor  Estimated Blood Loss: 25mL  Intra-op Medications:   Administrations occurring from 0700 to 0915 on 24:   Medication Name Total Dose   ropivacaine-epinephrine-clonidine-ketorolac 2.46-0.005- 0.0008-0.3mg/mL periarticular syringe 50 mL   lactated Ringer's infusion Cannot be calculated   ceFAZolin in dextrose (iso-os) (Ancef) IVPB 2 g 2 g              Anesthesia Record               Intraprocedure I/O Totals          Intake    Tranexamic Acid 0.00 mL    The total shown is the total volume documented since Anesthesia Start was filed.    lactated Ringer's infusion 1000.00 mL    ceFAZolin in dextrose (iso-os) (Ancef) IVPB 2 g 100.00 mL    Total Intake 1100 mL          Specimen: No specimens collected     Staff:   Circulator: Mary Rodriguez RN  Scrub Person: Denisa Fernandez; Shayy Decker         Drains and/or Catheters:   Closed/Suction Drain Right;Anterior Knee Accordion 15 Fr. (Active)       Tourniquet Times:   * Missing tourniquet times found for documented tourniquets in lo *     Implants:  Implants       Type Name Action Serial No.      Joint Knee BONE CEMENT, SMART  SET, HIGH VISCOSITY, 40GM - [id number] Implanted      Joint Knee BONE CEMENT, SMART SET, HIGH VISCOSITY, 40GM - [id number] Implanted       SIZE 8 ATTUNE FIXED BEARING TIBIAL BASE, CEMENTED Implanted      Joint Knee INSERT, ATTUNE PS FB, SZ 8, 8MM - [id number] Implanted      Joint Knee DOME, PATELLA, MEDIALIZED, 41MM - [id number] Implanted      Joint Knee FEMORAL, ATTUNE PS, MAGGY, SZ 8, RT - [id number] Implanted               Findings: severe OA    Indications: Toby Olea is an 63 y.o. male who is having surgery for Unilateral primary osteoarthritis, right knee [M17.11].     The patient was seen in the preoperative area. The risks, benefits, complications, treatment options, non-operative alternatives, expected recovery and outcomes were discussed with the patient. The possibilities of reaction to medication, pulmonary aspiration, injury to surrounding structures, bleeding, recurrent infection, the need for additional procedures, failure to diagnose a condition, and creating a complication requiring transfusion or operation were discussed with the patient. The patient concurred with the proposed plan, giving informed consent.  The site of surgery was properly noted/marked if necessary per policy. The patient has been actively warmed in preoperative area. Preoperative antibiotics have been ordered and given within 1 hours of incision. Venous thrombosis prophylaxis have been ordered including bilateral sequential compression devices    Procedure Details: R TKA  Complications:  None; patient tolerated the procedure well.    Disposition: PACU - hemodynamically stable.  Condition: stable     PREOPERATIVE DIAGNOSIS:  right knee osteoarthritis     POSTOPERATIVE DIAGNOSIS:  right knee osteoarthritis     OPERATION/PROCEDURE:  right total knee arthroplasty     SURGEON:  Christian Loving MD     ASSISTANT(S):  Sae    The patient's surgery was assisted by KENN Ovalle, due to lack of availability of a qualified resident to  assist with the surgery.  Alka Quevedo was present for the essential parts of the procedure.  She acted as first assistant and assisted with preparing the leg, draping the leg, exposing the knee, retracting and manipulating the leg during surgery, facilitating safe performance of the procedure, and closure of the wound.     ANESTHESIA:  spinal     LOCATION:  [facility]     ESTIMATED BLOOD LOSS AND INTRAVENOUS FLUIDS:  Please see Anesthesia record     COMPONENTS USED:  DePuy Attune knee system  1. 8 femur  2. 8 tibia  3. 41 patella  4. 8mm insert     BRIEF CLINICAL NOTE:  The patient is a 62 yo male with advanced osteoarthritis of  their right knee.  They failed conservative treatment and wished to  proceed with total knee arthroplasty which is indicated at this time.  We discussed the risks, benefits, and alternatives of surgery  including but not limited to infection, damage to vessel or nerve,  bleeding, soft tissue pain, DVT, PE, problems with anesthesia, lack  of range of motion, continued soft tissue pain, need for further  surgery, etc.  Consent was obtained.  They were taken to the operating  room in order to undergo the procedure.    PRE-OP ROM:      OPERATIVE REPORT:  The patient was transferred to the operating room table.  Time-out  was performed confirming patient name, medical record number,  surgical site, and adequate and appropriate imaging.  The patient  received appropriate IV antibiotics as well as tranexamic acid.  Once we were prepped and draped,midline skin incision was performed.  Hemostasis was obtained using electrocautery.  Underlying extensor mechanism was easily identified and entered using a standard medial parapatellar arthrotomy performedsharply.  The infrapatellar and suprapatellar fat pads were debrided.Initial medial release was performed.  The patella was subluxed laterally.  Distal femur was entered using a step drill.  Distal  femoral cut was performed, and the femur was sized and  prepared.  The tibia was subluxed forward using a double-prong PCL retractor.  The proximal tibia was cut in neutral mechanical axis using an  extramedullary tibial guide.  We then used the lamina  to clear out the posterior osteophytes and clear the meniscal remnants. We then sized the tibia and prepared it. We cut the patella freehand using a caliper to restore the native patellar height. We then trialed with multiple polyethylene inserts.  Once I was happy with the position of components and stability of the knee, all trial components were removed.  The  cut bony surfaces were thoroughly irrigated and dried.  We then cemented into place the real components.  The knee was held in extension until all cement was hardened.  All extraneous cement was  removed.  We then closed the extensor mechanism over a drain using interrupted #2 Ethibond as well as interrupted 0 Vicryl.  The subcu was closed with interrupted 2-0 Vicryl.  The skin was closed with  running 3-0 Biosyn followed by Dermabond and Steri-Strips.  Dry sterile dressing was placed.  The patient was transferred back to the hospital bed without incident or complication.  She will be  weightbearing as tolerated.  They will be on ASA and SCDs for DVT prophylaxis.     Additional Details: WBAT, ASA    Attending Attestation: I was present and scrubbed for the key portions of the procedure.

## 2024-05-06 NOTE — PERIOPERATIVE NURSING NOTE
Patient assisted to bathroom to try and void. Pt. Was able to void 100ml. Will attempt to void more volume in approximately 30 minutes.

## 2024-05-06 NOTE — ANESTHESIA PROCEDURE NOTES
Initial Anesthesia Post-op Note    Patient: Amilcar Giron  Procedure(s) Performed: EGDENDOSCOPIC ULTRASOUND (UPPER)  Anesthesia type: MAC    Vitals Value Taken Time   Temp 98.1 01/06/22 1324   Pulse 77 01/06/22 1323   Resp 21 01/06/22 1323   SpO2 93 % 01/06/22 1323   /80 01/06/22 1321   Vitals shown include unvalidated device data.      Patient Location: PACU Phase 1  Post-op Vital Signs:stable  Level of Consciousness: awake and lethargic  Respiratory Status: room air  Cardiovascular blood pressure returned to baseline  Hydration: euvolemic  Pain Management: well controlled  Handoff: Handoff to receiving clinician was performed and questions were answered  Vomiting: none  Nausea: None  Airway Patency:patent  Post-op Assessment: no complications, patient tolerated procedure well with no complications, no evidence of recall, dentition within defined limits and No Corneal Abrasion    #404 Anesthesiology Smoking Abstinence    The patient is a current smoker (e.g. cigarette, cigar, pipe, e-cigarette/vaping/marijuana)? No ()      #424 Perioperative Temperature Management    Anesthesia time was 60 minutes or longer? No (4256F)      #477 Multimodal Pain Management    The Case is Emergent - Exclusion No Patient was NOT administered multimodal pain management (2 or more, non-opioid, between 6 hours prior to anesthesia and discharge from PACU) List medical reason not given: patient reports no pain in PACU ()      #430 ADULT prevention of Post-Operative Nausea & Vomiting (PONV) - Combination Therapy (18 years and older)    Patient received an inhalational anesthetic? No ()      #463 PEDIATRIC Prevention of Post-Operative Nausea & Vomiting (PONV) - Combination Therapy (17 years and younger)    The inhalational anesthetic was only used for induction? No Patient is NOT 3 - 17 years old ()      #76 Prevention of Central Venous Catheter (CVC) - Related Bloodstream Infections     Patient had a central line  Spinal Block    Patient location during procedure: OR  Start time: 5/6/2024 7:05 AM  End time: 5/6/2024 7:09 AM  Reason for block: primary anesthetic and at surgeon's request  Staffing  Performed: JALEESA   Authorized by: Jaylene Hubbard MD    Performed by: JALEESA Villaseñor    Preanesthetic Checklist  Completed: patient identified, IV checked, site marked, risks and benefits discussed, surgical consent, monitors and equipment checked, pre-op evaluation, timeout performed and sterile techniques followed  Block Timeout  RN/Licensed healthcare professional reads aloud to the Anesthesia provider and entire team: Patient identity, procedure with side and site, patient position, and as applicable the availability of implants/special equipment/special requirements.  Patient on coagulant treatment: no  Timeout performed at: 5/6/2024 7:03 AM  Spinal Block  Patient position: sitting  Prep: Betadine  Sterility prep: cap, drape, gloves, hand hygiene and mask  Sedation level: light sedation  Patient monitoring: heart rate, continuous pulse oximetry and blood pressure  Approach: midline  Vertebral space: L3-4  Injection technique: single-shot  Needle  Needle type: pencil-point   Needle gauge: 25 G  Needle length: 3.5 in (3.5 inches)  Free flowing CSF: yes    Assessment  Sensory level: T6  Block outcome: patient comfortable  Procedure assessment: patient sedated but conversant throughout procedure  Additional Notes  Patient tolerated procedure well with no immediate complications.      3 ml of local placed 1st (1% Lidocaine).    Medication dosage:    2.0 ml  of 0.75 % Bupivacaine with 8.25% Dextrose.    Lot #:  [id number]  Expiration date:  03/31/2026           placed? No, a central line was not placed during this case.        Optimetrix Number:       No complications documented.

## 2024-05-06 NOTE — PERIOPERATIVE NURSING NOTE
Patient worked and passed with PT. Patient was unable to void on 1st attempt. Bladder scanned for 830ml.

## 2024-05-06 NOTE — DISCHARGE SUMMARY
MD Alka Perkins, MPAS, PAThiagoC, ATC  Adult Reconstruction and Joint Replacement Surgery  Phone: [phone]     Fax:[phone]             Discharge Summary    Discharge Diagnosis  Right Total Knee Arthroplasty    Issues Requiring Follow-Up  Home care services to start within 48 hours. Outpatient PT to start 2 weeks  S/P total Joint for Knees only. Hips optional.    Test Results Pending At Discharge  Pending Labs       No current pending labs.          Hospital Course  Patient underwent Right Total Knee Arthroplasty on 5/6/24 without complications. The patient was then taken to the PACU in stable condition. Patient was then transferred to the or.  Pain was appropriately controlled. Diet was advanced as tolerated. Patient progressed adequately through their recovery during hospital stay including PT/ OT and were recommended for discharge. Patient was then discharged on  to home in stable condition. Patient had uneventful hospital course. Patient was instructed on the use of pain medications as needed for pain. The signs and symptoms of infection were discussed and the patient was given our number to call should they have any questions or concerns following discharge.    Based on my clinical judgment, the patient was provided with a 7-day prescription for opioid medication at 30 MED, indicated for treatment of acute pain in the setting of recent Total Joint Arthroplasty. OARRS report was run and has demonstrated an appropriate time course.  The patient has been provided with counseling pertaining to safe use of opioid medication.    Patient may use operative extremity WBAT with use of walker for assistance with ambulation .  Mepilex dressing to be removed POD # 7 by home care and incision left open to air  OAC for DVT prophylaxis started on POD #1 and to be taken for 30 days    Patient is to follow-up in 6 weeks at scheduled post-op visit.     Face-to Face after surgery progress  note  Pertinent Physical Exam At Time of Discharge  Review of Systems   Constitutional: Negative.  Negative for activity change, chills, fatigue and fever.   HENT: Negative.     Eyes: Negative.    Respiratory: Negative.  Negative for cough, chest tightness, shortness of breath and wheezing.    Cardiovascular: Negative.  Negative for palpitations.   Gastrointestinal:  Negative for abdominal pain, blood in stool, nausea and vomiting.   Endocrine: Negative.  Negative for cold intolerance and polyuria.   Genitourinary: Negative.  Negative for difficulty urinating, dysuria, frequency, hematuria and urgency.   Musculoskeletal:  Positive for gait problem and joint swelling. Negative for arthralgias and back pain.   Skin: Negative.  Negative for color change, pallor, rash and wound.   Allergic/Immunologic: Negative.  Negative for environmental allergies.   Neurological:  Negative for dizziness, weakness and light-headedness.   Hematological: Negative.    Psychiatric/Behavioral:  Negative for agitation, confusion and suicidal ideas. The patient is not nervous/anxious.    All other systems reviewed and are negative    Physical Exam  side: right knee  Vitals and nursing note reviewed. VSS, Afebrile  Constitutional:       Appearance: Normal appearance, awake and alert.  HENT:      Head: Normocephalic and atraumatic.       Pupils: Pupils are equal, round, and reactive to light.   Cardiovascular:      Rate and Rhythm: Normal rate and regular rhythm.   Pulmonary:      Effort: Pulmonary effort is normal.     Abdominal:         Palpations: Abdomen is soft.   Musculoskeletal:   Sensation intact bilaterally, sural/saph/sp/tibal n.  Motor intact flexion/extension/DF/PF/EHL/FHL bilaterally. Palpable symmetric DP/PT pulse bilaterally. Spinal wearing off.    Skin:      Bulky Dressing intact to the surgical extremity. No signs of gross bloody or purulent drainage.     General: Skin is warm and dry.      Capillary Refill: Capillary refill  takes less than 2 seconds.   Neurological:      General: No focal deficit present.      Mental Status: She is alert and oriented to person, place, and time. Mental status is at baseline.   Psychiatric:         Mood and Affect: Mood normal.        Home Medications  Scheduled medications    Current Facility-Administered Medications:     ceFAZolin in dextrose (iso-os) (Ancef) IVPB 2 g, 2 g, intravenous, Once, Alka Quevedo PA-C    lactated Ringer's infusion, 75 mL/hr, intravenous, Continuous, lAka Quevedo PA-C, Last Rate: 75 mL/hr at 05/06/24 0606, 75 mL/hr at 05/06/24 0606    oxyCODONE ER (OxyCONTIN) 12 hr tablet 10 mg, 10 mg, oral, Once, Alka Quevedo PA-C    scopolamine (Transderm-Scop) patch 1 patch, 1 patch, transdermal, q72h, Alka Quevedo PA-C, 1 patch at 05/06/24 0606     PRN medications      Discharge medications     Your medication list        START taking these medications        Instructions Last Dose Given Next Dose Due   acetaminophen 500 mg tablet  Commonly known as: Tylenol Extra Strength      Take 2 tablets (1,000 mg) by mouth every 6 hours if needed for mild pain (1 - 3).       docusate sodium 100 mg capsule  Commonly known as: Colace      Take 1 capsule (100 mg) by mouth 2 times a day.       meloxicam 15 mg tablet  Commonly known as: Mobic      Take 1 tablet (15 mg) by mouth once daily.       oxyCODONE 5 mg immediate release tablet  Commonly known as: Roxicodone      Take 1 tablet (5 mg) by mouth every 6 hours if needed for severe pain (7 - 10) for up to 7 days.       pantoprazole 40 mg EC tablet  Commonly known as: ProtoNix      Take 1 tablet (40 mg) by mouth once daily. Do not crush, chew, or split.       polyethylene glycol 17 gram packet  Commonly known as: Glycolax, Miralax      Take 17 g by mouth once daily. Mix 1 cap (17g) into 8 ounces of fluid.       traMADol 50 mg tablet  Commonly known as: Ultram      Take 1 tablet (50 mg) by mouth every 6 hours if needed for severe pain (7 - 10)  for up to 7 days.              CHANGE how you take these medications        Instructions Last Dose Given Next Dose Due   aspirin 81 mg EC tablet  What changed: when to take this      Take 1 tablet (81 mg) by mouth 2 times a day.              CONTINUE taking these medications        Instructions Last Dose Given Next Dose Due   chlorthalidone 50 mg tablet  Commonly known as: [place]           CHOLECALCIFEROL (VITAMIN D3) ORAL           co Q10-red yeast rice  mg capsule           FISH OIL ORAL           Glucosamine 500 mg tablet  Generic drug: glucosamine sulfate           Klor-Con M20 20 mEq ER tablet  Generic drug: potassium chloride CR           losartan 100 mg tablet  Commonly known as: Cozaar           metFORMIN 500 mg tablet  Commonly known as: Glucophage           multivitamin tablet           SAW PALMETTO ORAL           TURMERIC ORAL                  STOP taking these medications      chlorhexidine 0.12 % solution  Commonly known as: Peridex        ibuprofen 200 mg tablet                  Where to Get Your Medications        These medications were sent to [facility]  [address], [address], [place] [facility] [address]      Hours: 8 AM to 6 PM Mon-Fri, 9 AM to 1 PM Saturday Phone: [phone]   acetaminophen 500 mg tablet  aspirin 81 mg EC tablet  docusate sodium 100 mg capsule  meloxicam 15 mg tablet  oxyCODONE 5 mg immediate release tablet  pantoprazole 40 mg EC tablet  polyethylene glycol 17 gram packet  traMADol 50 mg tablet         You have not been prescribed any medications.     Outpatient Follow-Up  Patient to follow-up with /Alka Quevedo PA-C.  Thank you for trusting us with your care. You should be scheduled for a follow-up post-surgical visit in 6 weeks.    Special Instructions  none    Please read discharge instructions provided by your surgeon before calling with questions as this will delay care.    Medication refills-Oxycodone and Tramadol will be refilled every 7 days  per state law. Request refills through [facility] Navigator at the institution in which you had surgery or MyChart. All medication requests may take up to 72 hours to refill and refills after Friday 1pm will be refilled on the next business day.      No future appointments.    KAREY Cardona, PA-C ATC  Orthopedic Physician Assisant  Adult Reconstruction and Total Joint Replacement  General Orthopedics  Department of Orthopaedic Surgery  [place] [address]  [email or web address] messaging preferred

## 2024-05-06 NOTE — PERIOPERATIVE NURSING NOTE
Patient able to void another 250ml. Hemovac drain dc'd. Drain was intact and minimal drainage from site noted.

## 2024-05-07 ENCOUNTER — HOME CARE VISIT (OUTPATIENT)
Dept: HOME HEALTH SERVICES | Facility: HOME HEALTH | Age: 64
End: 2024-05-07
Payer: MEDICARE

## 2024-05-07 VITALS
RESPIRATION RATE: 16 BRPM | SYSTOLIC BLOOD PRESSURE: 136 MMHG | OXYGEN SATURATION: 94 % | TEMPERATURE: 98.1 F | HEART RATE: 60 BPM | DIASTOLIC BLOOD PRESSURE: 70 MMHG

## 2024-05-07 PROCEDURE — 0023 HH SOC

## 2024-05-07 PROCEDURE — G0151 HHCP-SERV OF PT,EA 15 MIN: HCPCS

## 2024-05-07 SDOH — HEALTH STABILITY: PHYSICAL HEALTH: EXERCISE COMMENTS: SITITNG LAQ, KNEE FLEXION 10 REPS  SUPINE QUAD SETS, SAQ, HEEL SLIDES 10 REPS

## 2024-05-07 ASSESSMENT — ACTIVITIES OF DAILY LIVING (ADL)
AMBULATION ASSISTANCE ON FLAT SURFACES: 1
PHYSICAL TRANSFERS ASSESSED: 1
CURRENT_FUNCTION: STAND BY ASSIST
AMBULATION ASSISTANCE: 1
ENTERING_EXITING_HOME: NEEDS ASSISTANCE
AMBULATION ASSISTANCE: STAND BY ASSIST
AMBULATION_DISTANCE/DURATION_TOLERATED: 40
OASIS_M1830: 05

## 2024-05-07 ASSESSMENT — ENCOUNTER SYMPTOMS
PAIN LOCATION: RIGHT KNEE
MUSCLE WEAKNESS: 1
OCCASIONAL FEELINGS OF UNSTEADINESS: 0
PERSON REPORTING PAIN: PATIENT
PAIN: 1
HIGHEST PAIN SEVERITY IN PAST 24 HOURS: 7/10
LOWEST PAIN SEVERITY IN PAST 24 HOURS: 3/10
LIMITED RANGE OF MOTION: 1
PAIN SEVERITY GOAL: 2/10

## 2024-05-07 NOTE — HOME HEALTH
Patient is an 63 yr old male who had R TKR at [facility] 5-6-24 by Dr. Loving. PMH DM2, HTN. PFS Patient lives in home with wife, bedroom and shower upstairs, 1/2 bath 1st floor, ambulating without device indep, indep with ADLs, driving, working from home. Dressing to be removed in 7 days. Patient will be having L TKR on June 24 and has Dr bar for this on 5-16. Follow up for R TKR is June 20.

## 2024-05-07 NOTE — SIGNIFICANT EVENT
Thank you for taking my call today regarding your recent joint replacement surgery with Dr. Christian Loving.      We discussed that: Home Health Care services (physical and/or occupational therapy) have been initiated Your pain is Controlled on the current regimen Will fluctuate throughout recovery with increased activity You are able to tolerate regular activity and exercises The importance of continued cold therapy throughout recovery The importance of following the prescribed precautions by your surgeon You have not had a bowel movement, and we discussed the importance of a well balanced diet, hydration, and continued use of stool softener/laxative as prescribed The importance of continuing blood thinner as prescribed The importance of wearing compression stockings as prescribed    You indicated that all of your questions have been answered at the time of our call.    Please don't hesitate to reach out if you have any additional questions or concerns.    Denise Singer MBA, BSN, RN-BC  MILTON Holley, RN  Orthopedic Program Navigators  [facility] [phone]

## 2024-05-10 ENCOUNTER — HOME CARE VISIT (OUTPATIENT)
Dept: HOME HEALTH SERVICES | Facility: HOME HEALTH | Age: 64
End: 2024-05-10
Payer: MEDICARE

## 2024-05-10 VITALS
RESPIRATION RATE: 16 BRPM | SYSTOLIC BLOOD PRESSURE: 130 MMHG | OXYGEN SATURATION: 94 % | HEART RATE: 92 BPM | DIASTOLIC BLOOD PRESSURE: 80 MMHG | TEMPERATURE: 98.2 F

## 2024-05-10 PROCEDURE — G0151 HHCP-SERV OF PT,EA 15 MIN: HCPCS

## 2024-05-10 SDOH — HEALTH STABILITY: PHYSICAL HEALTH
EXERCISE COMMENTS: SUPINE QUAD SETS, ANKLE PUMPS, HEEL SLIDES, SAQ  SITTING HIP FLEXION, LAQ  STANDING TOE RAISES, KNEE FLEXION, HIP FLEXION 10 REPS

## 2024-05-10 ASSESSMENT — ENCOUNTER SYMPTOMS
LOWEST PAIN SEVERITY IN PAST 24 HOURS: 1/10
PERSON REPORTING PAIN: PATIENT
PAIN: 1
LIMITED RANGE OF MOTION: 1
HIGHEST PAIN SEVERITY IN PAST 24 HOURS: 1/10
PAIN LOCATION: RIGHT KNEE

## 2024-05-10 ASSESSMENT — ACTIVITIES OF DAILY LIVING (ADL)
AMBULATION ASSISTANCE: STAND BY ASSIST
AMBULATION ASSISTANCE: 1
AMBULATION ASSISTANCE ON FLAT SURFACES: 1
AMBULATION_DISTANCE/DURATION_TOLERATED: 50

## 2024-05-13 ENCOUNTER — HOME CARE VISIT (OUTPATIENT)
Dept: HOME HEALTH SERVICES | Facility: HOME HEALTH | Age: 64
End: 2024-05-13
Payer: MEDICARE

## 2024-05-13 VITALS
SYSTOLIC BLOOD PRESSURE: 138 MMHG | HEART RATE: 88 BPM | TEMPERATURE: 98.9 F | DIASTOLIC BLOOD PRESSURE: 70 MMHG | OXYGEN SATURATION: 91 %

## 2024-05-13 PROCEDURE — G0151 HHCP-SERV OF PT,EA 15 MIN: HCPCS

## 2024-05-13 SDOH — HEALTH STABILITY: PHYSICAL HEALTH
EXERCISE COMMENTS: SUPINE QUAD SETS, ANKLE PUMPS, HEEL SLIDES, SAQ, SLR  SITTING HIP FLEXION, LAQ  STANDING TOE RAISES, HIP FLEXION, KNEE FLEXION 15 REPS

## 2024-05-13 ASSESSMENT — ENCOUNTER SYMPTOMS
PAIN LOCATION: RIGHT KNEE
PAIN SEVERITY GOAL: 1/10
SUBJECTIVE PAIN PROGRESSION: GRADUALLY IMPROVING
LOWEST PAIN SEVERITY IN PAST 24 HOURS: 1/10
HIGHEST PAIN SEVERITY IN PAST 24 HOURS: 1/10
LIMITED RANGE OF MOTION: 1

## 2024-05-13 ASSESSMENT — ACTIVITIES OF DAILY LIVING (ADL)
AMBULATION ASSISTANCE: 1
AMBULATION ASSISTANCE: SUPERVISION
AMBULATION_DISTANCE/DURATION_TOLERATED: 50
AMBULATION ASSISTANCE ON FLAT SURFACES: 1

## 2024-05-15 ENCOUNTER — HOME CARE VISIT (OUTPATIENT)
Dept: HOME HEALTH SERVICES | Facility: HOME HEALTH | Age: 64
End: 2024-05-15
Payer: MEDICARE

## 2024-05-15 VITALS
RESPIRATION RATE: 16 BRPM | OXYGEN SATURATION: 95 % | TEMPERATURE: 98.4 F | SYSTOLIC BLOOD PRESSURE: 130 MMHG | HEART RATE: 87 BPM | DIASTOLIC BLOOD PRESSURE: 70 MMHG

## 2024-05-15 PROCEDURE — G0151 HHCP-SERV OF PT,EA 15 MIN: HCPCS

## 2024-05-15 SDOH — HEALTH STABILITY: PHYSICAL HEALTH
EXERCISE COMMENTS: SUPINE QUAD SETS, HEEL SLIDES, SAQ 15 REPS  STANDING HAMSTRING STRETCH ON STEP, TOE RAISES, KNEE FLEXION, HIP FLEXION 15 REPS

## 2024-05-15 ASSESSMENT — ACTIVITIES OF DAILY LIVING (ADL)
AMBULATION ASSISTANCE: SUPERVISION
AMBULATION ASSISTANCE: 1
AMBULATION ASSISTANCE ON FLAT SURFACES: 1
AMBULATION_DISTANCE/DURATION_TOLERATED: 75

## 2024-05-15 ASSESSMENT — ENCOUNTER SYMPTOMS
PERSON REPORTING PAIN: PATIENT
DENIES PAIN: 1

## 2024-05-16 ENCOUNTER — OFFICE VISIT (OUTPATIENT)
Dept: ORTHOPEDIC SURGERY | Facility: CLINIC | Age: 64
End: 2024-05-16
Payer: MEDICARE

## 2024-05-16 VITALS — BODY MASS INDEX: 27.64 KG/M2 | HEIGHT: 76 IN | WEIGHT: 227 LBS

## 2024-05-16 DIAGNOSIS — M17.12 PRIMARY OSTEOARTHRITIS OF LEFT KNEE: Primary | ICD-10-CM

## 2024-05-16 PROCEDURE — 99214 OFFICE O/P EST MOD 30 MIN: CPT | Performed by: ORTHOPAEDIC SURGERY

## 2024-05-16 PROCEDURE — 1036F TOBACCO NON-USER: CPT | Performed by: ORTHOPAEDIC SURGERY

## 2024-05-16 PROCEDURE — 99214 OFFICE O/P EST MOD 30 MIN: CPT | Mod: 58 | Performed by: ORTHOPAEDIC SURGERY

## 2024-05-16 ASSESSMENT — PAIN SCALES - GENERAL: PAINLEVEL_OUTOF10: 7

## 2024-05-16 ASSESSMENT — PAIN - FUNCTIONAL ASSESSMENT: PAIN_FUNCTIONAL_ASSESSMENT: 0-10

## 2024-05-16 ASSESSMENT — PAIN DESCRIPTION - DESCRIPTORS: DESCRIPTORS: ACHING;CRUSHING;SHARP

## 2024-05-16 NOTE — PROGRESS NOTES
Patient is a 63-year-old gentleman who presents today for discussion of upcoming left total knee arthroplasty.  He recently underwent right total knee arthroplasty is doing very well from that standpoint.  He rates his pain on the left side as 8 out of 10.  He has difficulty walking sort of distance.  He tried anti-inflammatories and bracing.  Nothing is helping.  He would like to proceed with left total knee arthroplasty which is indicated at this time.    Left knee:  AAOx3, NAD, walks with a moderate antalgic gait  Varus allignment  Range of motion lacks 10 degrees of full extension and flexes to 110 degrees  Stable to varus/valgus/anterior/posterior stress through out the range of motion  Slight laxity with varus stress  Diffuse medial  joint line tenderness to palpation  Moderate effusion  SILT in a leonidas/saph/per/tib distribution  5/5 knee extension/df/pf/ehl  ½ dorsalis pedis and posterior tibial pulse  no popliteal lymphadenopathy  no other overlying lesions  mood: euthymic  Respirations non labored    Plain films were reviewed by myself in clinic today.  They have advanced osteoarthritis of their knee with significant joint space narrowing, bone on bone changes, underlying sclerosis and tricompartmental osteophytic change.    Patient is now failed a greater than 3-month trial of reasonable conservative treatment and wishes proceed with surgery which is indicated at this time.  Discussed the risk benefits alternatives to surgery.  All of their questions were answered.    Procedure: left total knee  [place] [facility] [place]  ICD10: M17.12  CPT: 17499  Stay: outpatient  Equipment: [facility]    I talked with the patient at length about risks, limitations, benefits and alternatives to total knee replacement today. I reviewed concerns about implant wear, loosening, breakage, infection and infection prophylaxis, DVT, PE, death and other medical and anesthetic complications of surgery. We talked about the potential  for persistent pain following surgery since there are many possible causes for knee and leg pain. The patient was advised that knee replacement will only relieve pain that is coming from the knee. We talked about limited range of motion following knee replacement and the importance of physical therapy and their motivation. We talked about improvements in pain management post-operatively and our accelerated rehab program. We talked about wound healing issues and neurovascular complications of surgery. I reviewed functional and activity restrictions in detail. We discussed the possible need for a homologous blood transfusion. We discussed the fact that many of our patients are able to go home in 1 day or less depending on their health, mobility, pre-op preparation, individual home situation and personal preference.  The patient has identified their personal goals of their joint replacement surgery and recovery and we have discussed them. These are documented in our [employer] patient engagement platform. In addition, we have discussed the advantages and disadvantages of various implant and fixation options, as well as various surgical approaches.  The basic concepts of the joint replacement procedure has been reviewed with the patient and the patient has been provided the opportunity to see an actual implant either in the office or in our pre-op education class.  All of the patients questions were answered. The patient can call my office to schedule surgery and the pre-op teaching class. I told the patient that they should contact their primary care physician to discuss fitness for surgery.    This note was created using voice recognition software and was not corrected for typographical or grammatical errors.

## 2024-05-20 ENCOUNTER — HOME CARE VISIT (OUTPATIENT)
Dept: HOME HEALTH SERVICES | Facility: HOME HEALTH | Age: 64
End: 2024-05-20
Payer: MEDICARE

## 2024-05-20 VITALS
TEMPERATURE: 97.9 F | OXYGEN SATURATION: 93 % | HEART RATE: 88 BPM | DIASTOLIC BLOOD PRESSURE: 70 MMHG | SYSTOLIC BLOOD PRESSURE: 144 MMHG

## 2024-05-20 PROBLEM — M17.12 UNILATERAL PRIMARY OSTEOARTHRITIS, LEFT KNEE: Status: ACTIVE | Noted: 2024-05-19

## 2024-05-20 PROCEDURE — G0151 HHCP-SERV OF PT,EA 15 MIN: HCPCS

## 2024-05-20 SDOH — HEALTH STABILITY: PHYSICAL HEALTH: EXERCISE COMMENTS: INSTRUCTED IN SUPINE AND STANDING EXERCISES TO CONTINUE UNTIL OUTPATIENT

## 2024-05-20 ASSESSMENT — ACTIVITIES OF DAILY LIVING (ADL)
OASIS_M1830: 00
AMBULATION ASSISTANCE: INDEPENDENT
AMBULATION ASSISTANCE ON FLAT SURFACES: 1
HOME_HEALTH_OASIS: 00
AMBULATION ASSISTANCE: 1

## 2024-05-20 ASSESSMENT — ENCOUNTER SYMPTOMS
PERSON REPORTING PAIN: PATIENT
OCCASIONAL FEELINGS OF UNSTEADINESS: 0
DENIES PAIN: 1

## 2024-05-28 ENCOUNTER — EVALUATION (OUTPATIENT)
Dept: PHYSICAL THERAPY | Facility: CLINIC | Age: 64
End: 2024-05-28
Payer: MEDICARE

## 2024-05-28 DIAGNOSIS — Z96.651 S/P TKR (TOTAL KNEE REPLACEMENT) USING CEMENT, RIGHT: ICD-10-CM

## 2024-05-28 DIAGNOSIS — Z74.09 IMPAIRED FUNCTIONAL MOBILITY, BALANCE, GAIT, AND ENDURANCE: ICD-10-CM

## 2024-05-28 DIAGNOSIS — Z96.651 S/P TKR (TOTAL KNEE REPLACEMENT), RIGHT: Primary | ICD-10-CM

## 2024-05-28 PROCEDURE — 97161 PT EVAL LOW COMPLEX 20 MIN: CPT | Mod: GP

## 2024-05-28 PROCEDURE — 97110 THERAPEUTIC EXERCISES: CPT | Mod: GP

## 2024-05-28 NOTE — PROGRESS NOTES
Physical Therapy Evaluation    Patient Name: Toby Olea  MRN: [id number]  Today's Date: 05/28/24        Insurance:  Visit number: 1 of AUTH   Insurance Type: Payor: [employer] / Plan: [employer] / Product Type: *No Product type* /   Authorization or Plan of Care date Range: PER AUTH     Therapy diagnoses:   1. S/P TKR (total knee replacement), right        2. S/P TKR (total knee replacement) using cement, right  Referral to Physical Therapy      3. Impaired functional mobility, balance, gait, and endurance               Precautions:  HTN, left OA   Fall Risk: Low    SUBJECTIVE:  This knee was a repaired ACL and meniscus repair. The knee continued to degenerate and have a lot of bowing and OA. Now has had it replaced. It went very well. Has the left TKA pending at the end of June.   Was very limited in his ability leading up to the surgery. Unable to do stairs or walk much prior to surgery.   Now post op has minimal pian. Has pain along the outside of the knee.     Is active. Swims and walks as much as able. Doing a stationary bike daily.   Is motivated to return to all activities and active lifestyle.     Pain:  2/10 sides of the knee, feels better than it did prior to surgery.   Home Living:  Multi level home  Wife is a sub teacher, home  Works part time   Prior level of function:  INPD  Did limp and have limited motion prior to surgery     OBJECTIVE:    Knee AROM: (degrees) Left Right   Flexion 119 118   Extension 3 4     Knee PROM: (degrees) Left Right   Flexion     Extension  1     Knee Strength: MMT Left Right   Flexion 4+/5 4/5   Extension 4+/5 4/5     Hip AROM WFL LEILA   HIP MMT  Flexor right 4/5 left 5/5, abd right 4/5 left 4+/5, add 4+/5 leila, ext 4/5 leila   ANKLE AROM WFL leila   Ankle MMT DF 4+/5 PF 4+/5    Swelling/Girth:  KJL right 45.2 proximal 48.2 distal incision 35.5  KJL left 42.3    SLS unable without UE assist  NBOS unable wihtout UE assist     Gait: ambulation mild antalgia  off loading RLE INDP. Recp stair ambulation mod indp of HR, mild antalgia     Palpation: negative homans eze   No pitting edema  Incision is healing, distal end with scabbing. Distal stitch festering out, no signs of infection or drainage   Patellar mobility: WFL   Flexibility:   Hamstrings: mild to mod    Quadriceps: mild to mod    Hip Flexors: mild to mod     ASSESSMENT:  Pt is s/p right TKA with good motivation for rehab. Pt with skilled need for gait training, balance, endurance, NMR, and marco of activity. Pt educated in quality of movement versus speed of movement. Pt will need skilled PT to improve muscle recruitment and quality of motion. Pt leads an active lifestyle with need for dynamics strength and eccentric training of the LE.   Pt presents with the following deficits/problems that indicate a skilled need for PT:   ROM, strength, gait, balance, endurance, return to activity, edema   Level of Complexity: low    TREATMENT:  Bike 4 min   TG x 20 stop at 6    Stair stretches - hip flex, hamstring, gastroc 3 x 20 sec eze     Bridges   SLR with QS     PATIENT EDUCATION:  HEP  goals  safety  POC  interventions selected    Access Code: [email or web address]  URL: [email or web address]  Date: 05/28/2024  Prepared by: Denisa Ryder    Exercises  - Supine Quad Set  - 2 x daily - 7 x weekly - 2 sets - 10 reps - 5 hold  - Small Range Straight Leg Raise  - 2 x daily - 7 x weekly - 2 sets - 10 reps  - Supine Bridge  - 2 x daily - 7 x weekly - 2 sets - 10 reps  - Supine Knee Extension Strengthening  - 2 x daily - 7 x weekly - 2 sets - 10 reps  - Sidelying Hip Abduction  - 2 x daily - 7 x weekly - 2 sets - 10 reps  - Seated Long Arc Quad  - 2 x daily - 7 x weekly - 2-3 sets - 10 reps - 5 hold  - Standing Knee Flexion Stretch on Step  - 2 x daily - 7 x weekly - 2 sets - 4 reps - 20 hold  - Standing Hamstring Stretch with Step  - 2 x daily - 7 x weekly - 2 sets - 4 reps - 20 hold      PLAN:   Pt to be seen 1-2 times  per week for 6-8 weeks.   Pt POC to include:    Therapeutic EX, Therapeutic ACT, NMR, Self care, Manual therapy, Gait training, CP/MHP, Education      Rehab potential:  Good   Plan of care agreement  Patient   GOALS:  Active       PT Problem       PT Goal 1       Start:  05/28/24    Expected End:  07/12/24       1) Pain will be reduced to 0/10 at rest no more than 2/10 with activity.   2) Function will be increased to be able to complete household activity, biking, and walking in community safely unrestricted by pain.   3) ROM will be increased to be WNL at 0-120  4) Strength to be increased to be WNL at 5/5  5) Independent in Home Exercise Program  6) Independent return to   7) Outcome tool improvement to LEFS 60/80 or greater  8) SLS 10 sec eze, NBOS 20 sec eze   9) recp stair ambulation 1 HR non antalgically x 12, ambulation non antalgically with good quality of motion.                Patient Stated Goal 1       Start:  05/28/24    Expected End:  07/12/24       Get better

## 2024-06-04 ENCOUNTER — TREATMENT (OUTPATIENT)
Dept: PHYSICAL THERAPY | Facility: CLINIC | Age: 64
End: 2024-06-04
Payer: MEDICARE

## 2024-06-04 DIAGNOSIS — Z96.651 S/P TKR (TOTAL KNEE REPLACEMENT) USING CEMENT, RIGHT: ICD-10-CM

## 2024-06-04 DIAGNOSIS — Z96.651 S/P TKR (TOTAL KNEE REPLACEMENT), RIGHT: ICD-10-CM

## 2024-06-04 DIAGNOSIS — Z74.09 IMPAIRED FUNCTIONAL MOBILITY, BALANCE, GAIT, AND ENDURANCE: ICD-10-CM

## 2024-06-04 PROCEDURE — 97110 THERAPEUTIC EXERCISES: CPT | Mod: GP

## 2024-06-04 NOTE — PROGRESS NOTES
Physical Therapy Treatment    Patient Name: Toby Olea  MRN: [id number]    Today's Date: 6/4/2024  Visit: **/**    Referred by: Alka Quevedo        Diagnosis:   1. S/P TKR (total knee replacement), right  Follow Up In Physical Therapy      2. S/P TKR (total knee replacement) using cement, right  Follow Up In Physical Therapy      3. Impaired functional mobility, balance, gait, and endurance  Follow Up In Physical Therapy          PRECAUTIONS:  HTN, Left knee OA    SUBJECTIVE:  Pt with soreness. Pt with less pain than prior to surgery. Feels ok today.     OBJECTIVE:  Lack of TKE    TREATMENT:  - Therex:  Bike level 3 8 min.   Stair stretches - hip flex, hamstring, gastroc 3 x 20 sec eze     X 20 eze:  Step up 6 inch  Step lateral 6 inch    Air ex x 20 eze:  CR  Hip abd  Hip extension     NBOS activity     Supine 2 x 15 eze:  QS with SLR  SAQ 2#  Bridge  LAQ 3#    - - Modalities:      CP x 10  min supine in extension     ASSESSMENT:   Pt hilda well with no reports of pain. Did have fatigue at the end of the session. Hilda well with ability to adapt to cues for technique. Pt with tendency to forward flex supporting himself on arms. Uses arms to pull up on steps. Pt cued to reduce UE use and have a more upright posture. Pt challenged and hilda well.     PLAN:    Continue per POC.       Billing:

## 2024-06-06 ENCOUNTER — TREATMENT (OUTPATIENT)
Dept: PHYSICAL THERAPY | Facility: CLINIC | Age: 64
End: 2024-06-06
Payer: MEDICARE

## 2024-06-06 DIAGNOSIS — Z96.651 S/P TKR (TOTAL KNEE REPLACEMENT) USING CEMENT, RIGHT: ICD-10-CM

## 2024-06-06 DIAGNOSIS — Z74.09 IMPAIRED FUNCTIONAL MOBILITY, BALANCE, GAIT, AND ENDURANCE: ICD-10-CM

## 2024-06-06 DIAGNOSIS — Z96.651 S/P TKR (TOTAL KNEE REPLACEMENT), RIGHT: ICD-10-CM

## 2024-06-06 PROCEDURE — 97110 THERAPEUTIC EXERCISES: CPT | Mod: GP

## 2024-06-06 NOTE — PROGRESS NOTES
Physical Therapy Treatment    Patient Name: Toby Olea  MRN: [id number]    Today's Date: 6/6/2024  Visit #3    Referred by: Alka Quevedo        Diagnosis:   1. S/P TKR (total knee replacement), right  Follow Up In Physical Therapy      2. S/P TKR (total knee replacement) using cement, right  Follow Up In Physical Therapy      3. Impaired functional mobility, balance, gait, and endurance  Follow Up In Physical Therapy          PRECAUTIONS:  HTN, Left knee OA    SUBJECTIVE:  Pt with no reports of pain. Doing Ok. Walking INDP.     OBJECTIVE:  Supine AROM 3-119    TREATMENT:  - Therex:  Bike level 3 8 min.     Stair stretches - hip flex, hamstring, gastroc 3 x 20 sec eze     TG x 25    X 20 eze:  Step up 6 inch  Step lateral 6 inch    Air ex x 20 eze:  CR  Hip abd  Hip extension   Step tap anterior     NBOS activity 30 sec x 3 eze     Supine 2 x 15 eze:  QS with SLR  SAQ 3#  Bridge  LAQ 3#  S/l abduction   QS with 5 sec hold     - - Modalities:      CP x 10  min supine in extension     ASSESSMENT:   Pt challenged with exercise. Mild fatigue during session. Pt with cues for technique and reduce speed for the quality of the exercise. Pt able to marco program well. Pt cued to stand upright to work his hips and knees rather than relying on UE. Progressing well. Pt is progressing well thru program for TKA.         PLAN:    Continue per POC.       Billing:

## 2024-06-10 ENCOUNTER — TREATMENT (OUTPATIENT)
Dept: PHYSICAL THERAPY | Facility: CLINIC | Age: 64
End: 2024-06-10
Payer: MEDICARE

## 2024-06-10 DIAGNOSIS — Z96.651 S/P TKR (TOTAL KNEE REPLACEMENT), RIGHT: ICD-10-CM

## 2024-06-10 DIAGNOSIS — Z96.651 S/P TKR (TOTAL KNEE REPLACEMENT) USING CEMENT, RIGHT: ICD-10-CM

## 2024-06-10 DIAGNOSIS — Z74.09 IMPAIRED FUNCTIONAL MOBILITY, BALANCE, GAIT, AND ENDURANCE: ICD-10-CM

## 2024-06-10 PROCEDURE — 97110 THERAPEUTIC EXERCISES: CPT | Mod: GP

## 2024-06-10 NOTE — PROGRESS NOTES
Physical Therapy Treatment    Patient Name: Toby Olea  MRN: [id number]    Today's Date: 6/10/2024  Visit #4   36  units by 8/1/24 = 9 visits       Referred by: Alka Quevedo   Time Calculation  Start Time: 0930  Stop Time: 1015  Time Calculation (min): 45 min    Diagnosis:   1. S/P TKR (total knee replacement), right  Follow Up In Physical Therapy      2. S/P TKR (total knee replacement) using cement, right  Follow Up In Physical Therapy      3. Impaired functional mobility, balance, gait, and endurance  Follow Up In Physical Therapy          PRECAUTIONS:  HTN, Left knee OA    SUBJECTIVE:  Sore with going down stairs. Denies pain.     OBJECTIVE:  Improved ability to complete and hold QS.   Quad muscle weakness noted with QS and eccentric lowering     TREATMENT:  - Therex:  Bike level 3 8 min.     Stair stretches - hip flex, hamstring, gastroc 3 x 20 sec eze     TG x 25    X 20 eze:  Step up 8 inch  Step lateral 8 inch    Air ex x 20 eze:  CR  Hip abd  Hip extension   Step tap anterior   Step overs    NBOS activity 30 sec x 3 eze   SLS activity     Supine 2 x 15 eze:  QS with SLR  SAQ 3#  Bridge  LAQ 3#  S/l abduction   QS with 5 sec hold     - - Modalities:      CP x 10  min supine in extension     ASSESSMENT:   Continuous verbal cues to reduce UE use and bending forward as well as speed. Demonstration and cues given for emphasis of strength and technique. Pt able to progress with eccentric loading of the RLE. Pt challenged with technique. SLS challenging. Pt is progressing to goals of strength and NMR.       PLAN:    Continue per POC.       Billing:     PT Therapeutic Procedures Time Entry  Therapeutic Exercise Time Entry: 45

## 2024-06-11 ENCOUNTER — PRE-ADMISSION TESTING (OUTPATIENT)
Dept: PREADMISSION TESTING | Facility: HOSPITAL | Age: 64
End: 2024-06-11
Payer: MEDICARE

## 2024-06-11 ENCOUNTER — HOSPITAL ENCOUNTER (OUTPATIENT)
Dept: RADIOLOGY | Facility: HOSPITAL | Age: 64
Discharge: HOME | End: 2024-06-11
Payer: MEDICARE

## 2024-06-11 ENCOUNTER — LAB (OUTPATIENT)
Dept: LAB | Facility: LAB | Age: 64
End: 2024-06-11
Payer: MEDICARE

## 2024-06-11 VITALS
OXYGEN SATURATION: 95 % | TEMPERATURE: 97.4 F | HEART RATE: 94 BPM | DIASTOLIC BLOOD PRESSURE: 96 MMHG | BODY MASS INDEX: 27.12 KG/M2 | HEIGHT: 77 IN | WEIGHT: 229.72 LBS | RESPIRATION RATE: 16 BRPM | SYSTOLIC BLOOD PRESSURE: 146 MMHG

## 2024-06-11 DIAGNOSIS — Z01.818 PREOP TESTING: ICD-10-CM

## 2024-06-11 DIAGNOSIS — Z01.818 PREOP TESTING: Primary | ICD-10-CM

## 2024-06-11 DIAGNOSIS — M17.12 UNILATERAL PRIMARY OSTEOARTHRITIS, LEFT KNEE: ICD-10-CM

## 2024-06-11 LAB
ALBUMIN SERPL BCP-MCNC: 4.5 G/DL (ref 3.4–5)
ALP SERPL-CCNC: 63 U/L (ref 33–136)
ALT SERPL W P-5'-P-CCNC: 24 U/L (ref 10–52)
ANION GAP SERPL CALC-SCNC: 14 MMOL/L (ref 10–20)
AST SERPL W P-5'-P-CCNC: 20 U/L (ref 9–39)
BILIRUB SERPL-MCNC: 0.8 MG/DL (ref 0–1.2)
BUN SERPL-MCNC: 23 MG/DL (ref 6–23)
CALCIUM SERPL-MCNC: 10.6 MG/DL (ref 8.6–10.3)
CHLORIDE SERPL-SCNC: 98 MMOL/L (ref 98–107)
CO2 SERPL-SCNC: 29 MMOL/L (ref 21–32)
CREAT SERPL-MCNC: 1.05 MG/DL (ref 0.5–1.3)
EGFRCR SERPLBLD CKD-EPI 2021: 79 ML/MIN/1.73M*2
GLUCOSE SERPL-MCNC: 101 MG/DL (ref 74–99)
POTASSIUM SERPL-SCNC: 4 MMOL/L (ref 3.5–5.3)
PROT SERPL-MCNC: 7.3 G/DL (ref 6.4–8.2)
SODIUM SERPL-SCNC: 137 MMOL/L (ref 136–145)

## 2024-06-11 PROCEDURE — 77073 BONE LENGTH STUDIES: CPT

## 2024-06-11 PROCEDURE — 80053 COMPREHEN METABOLIC PANEL: CPT

## 2024-06-11 PROCEDURE — 36415 COLL VENOUS BLD VENIPUNCTURE: CPT

## 2024-06-11 PROCEDURE — 87081 CULTURE SCREEN ONLY: CPT | Mod: BEALAB

## 2024-06-11 PROCEDURE — 73562 X-RAY EXAM OF KNEE 3: CPT | Mod: LT

## 2024-06-11 RX ORDER — CHLORHEXIDINE GLUCONATE ORAL RINSE 1.2 MG/ML
SOLUTION DENTAL
Qty: 473 ML | Refills: 0 | Status: SHIPPED | OUTPATIENT
Start: 2024-06-11

## 2024-06-11 ASSESSMENT — DUKE ACTIVITY SCORE INDEX (DASI)
CAN YOU HAVE SEXUAL RELATIONS: YES
CAN YOU PARTICIPATE IN MODERATE RECREATIONAL ACTIVITIES LIKE GOLF, BOWLING, DANCING, DOUBLES TENNIS OR THROWING A BASEBALL OR FOOTBALL: YES
CAN YOU DO LIGHT WORK AROUND THE HOUSE LIKE DUSTING OR WASHING DISHES: YES
CAN YOU TAKE CARE OF YOURSELF (EAT, DRESS, BATHE, OR USE TOILET): YES
CAN YOU RUN A SHORT DISTANCE: YES
CAN YOU DO MODERATE WORK AROUND THE HOUSE LIKE VACUUMING, SWEEPING FLOORS OR CARRYING GROCERIES: YES
TOTAL_SCORE: 58.2
CAN YOU WALK A BLOCK OR TWO ON LEVEL GROUND: YES
DASI METS SCORE: 9.9
CAN YOU PARTICIPATE IN STRENOUS SPORTS LIKE SWIMMING, SINGLES TENNIS, FOOTBALL, BASKETBALL, OR SKIING: YES
CAN YOU DO HEAVY WORK AROUND THE HOUSE LIKE SCRUBBING FLOORS OR LIFTING AND MOVING HEAVY FURNITURE: YES
CAN YOU WALK INDOORS, SUCH AS AROUND YOUR HOUSE: YES
CAN YOU CLIMB A FLIGHT OF STAIRS OR WALK UP A HILL: YES
CAN YOU DO YARD WORK LIKE RAKING LEAVES, WEEDING OR PUSHING A MOWER: YES

## 2024-06-11 ASSESSMENT — ENCOUNTER SYMPTOMS
NECK NEGATIVE: 1
GASTROINTESTINAL NEGATIVE: 1
NEUROLOGICAL NEGATIVE: 1
EYES NEGATIVE: 1
RESPIRATORY NEGATIVE: 1
CARDIOVASCULAR NEGATIVE: 1
CONSTITUTIONAL NEGATIVE: 1
ENDOCRINE NEGATIVE: 1

## 2024-06-11 ASSESSMENT — PAIN SCALES - GENERAL: PAINLEVEL_OUTOF10: 5 - MODERATE PAIN

## 2024-06-11 ASSESSMENT — LIFESTYLE VARIABLES: SMOKING_STATUS: NONSMOKER

## 2024-06-11 NOTE — PREPROCEDURE INSTRUCTIONS
Thank you for visiting [facility].  If you have any changes to your health condition, please call the surgeons office to alert them and give them details of your symptoms.    Katalina Maloney PA-C  P: [phone]  Department of Anesthesiology and Perioperative Medicine  --    Preoperative Fasting Guidelines    Why must I stop eating and drinking near surgery time?  With sedation, food or liquid in your stomach can enter your lungs causing serious complications  Increases nausea and vomiting    When do I need to stop eating and drinking before my surgery?  Do not eat any food after midnight the night before your surgery/procedure.  You may have up to 13.5 ounces of clear liquid until TWO hours before your instructed arrival time to the hospital.  This includes water, black tea/coffee, (no milk or cream) apple juice, and electrolyte drinks (Gatorade)  You may chew gum until TWO hours before your surgery/procedure              Preoperative Brain Exercises    What are brain exercises?  A brain exercise is any activity that engages your thinking (cognitive) skills.    What types of activities are considered brain exercises?  Jigsaw puzzles, crossword puzzles, word jumble, memory games, word search, and many more.  Many can be found free online or on your phone via a mobile keyana.    Why should I do brain exercises before my surgery?  More recent research has shown brain exercise before surgery can lower the risk of postoperative delirium (confusion) which can be especially important for older adults.  Patients who did brain exercises for 5 to 10 hours the days before surgery, cut their risk of postoperative delirium in half up to 1 week after surgery.                  [facility]    Preoperative Deep Breathing Exercises    Why it is important to do deep breathing exercises before my surgery?  Deep breathing exercises strengthen your breathing muscles.  This helps you to  recover after your surgery and decreases the chance of breathing complications.      How are the deep breathing exercises done?  Sit straight with your back supported.  Breathe in deeply and slowly through your nose. Your lower rib cage should expand and your abdomen may move forward.  Hold that breath for 3 to 5 seconds.  Breathe out through pursed lips, slowly and completely.  Rest and repeat 10 times every hour while awake.  Rest longer if you become dizzy or lightheaded.      Patient Information: Incentive Spirometer  What is an incentive spirometer?  An incentive spirometer is a device used before and after surgery to “exercise” your lungs.  It helps you to take deeper breaths to expand your lungs.  Below is an example of a basic incentive spirometer.  The device you receive may differ slightly but they all function the same.    Why do I need to use an incentive spirometer?  Using your incentive spirometer prepares your lungs for surgery and helps prevent lung problems after surgery.  How do I use my incentive spirometer?  When you're using your incentive spirometer, make sure to breathe through your mouth. If you breathe through your nose, the incentive spirometer won't work properly. You can hold your nose if you have trouble.  If you feel dizzy at any time, stop and rest. Try again at a later time.  Follow the steps below:  Set up your incentive spirometer, expand the flexible tubing and connect to the outlet.  Sit upright in a chair or bed. Hold the incentive spirometer at eye level.   Put the mouthpiece in your mouth and close your lips tightly around it. Slowly breathe out (exhale) completely.  Breathe in (inhale) slowly through your mouth as deeply as you can. As you take a breath, you will see the piston rise inside the large column. While the piston rises, the indicator should move upwards. It should stay in between the 2 arrows (see Figure).  Try to get the piston as high as you can, while keeping the  indicator between the arrows.   If the indicator doesn't stay between the arrows, you're breathing either too fast or too slow.  When you get it as high as you can, hold your breath for 10 seconds, or as long as possible. While you're holding your breath, the piston will slowly fall to the base of the spirometer.  Once the piston reaches the bottom of the spirometer, breathe out slowly through your mouth. Rest for a few seconds.  Repeat 10 times. Try to get the piston to the same level with each breath.  Repeat every hour while awake  You can carefully clean the outside of the mouthpiece with an alcohol wipe or soap and water.            Patient and Family Education             Ways You Can Help Prevent Blood Clots             This handout explains some simple things you can do to help prevent blood clots.      Blood clots are blockages that can form in the body's veins. When a blood clot forms in your deep veins, it may be called a deep vein thrombosis, or DVT for short. Blood clots can happen in any part of the body where blood flows, but they are most common in the arms and legs. If a piece of a blood clot breaks free and travels to the lungs, it is called a pulmonary embolus (PE). A PE can be a very serious problem.         Being in the hospital or having surgery can raise your chances of getting a blood clot because you may not be well enough to move around as much as you normally do.         Ways you can help prevent blood clots in the hospital         Wearing SCDs. SCDs stands for Sequential Compression Devices.   SCDs are special sleeves that wrap around your legs  They attach to a pump that fills them with air to gently squeeze your legs every few minutes.   This helps return the blood in your legs to your heart.   SCDs should only be taken off when walking or bathing.   SCDs may not be comfortable, but they can help save your life.               Wearing compression stockings - if your doctor orders them.  These special snug fitting stockings gently squeeze your legs to help blood flow.       Walking. Walking helps move the blood in your legs.   If your doctor says it is ok, try walking the halls at least   5 times a day. Ask us to help you get up, so you don't fall.      Taking any blood thinning medicines your doctor orders.             [facility]; 3/23       Ways you can help prevent blood clots at home       Wearing compression stockings - if your doctor orders them. ? Walking - to help move the blood in your legs.       Taking any blood thinning medicines your doctor orders.      Signs of a blood clot or PE      Tell your doctor or nurse know right away if you have of the problems listed below.    If you are at home, seek medical care right away. Call 911 for chest pain or problems breathing.               Signs of a blood clot (DVT) - such as pain,  swelling, redness or warmth in your arm or leg      Signs of a pulmonary embolism (PE) - such as chest     pain or feeling short of breath           The Week before Surgery        Seven days before Surgery  Check your [facility] medication instructions  Do the exercises provided to you by [facility]   Arrange for a responsible, adult licensed  to take you home after surgery and stay with you for 24 hours.  You will not be permitted to drive yourself home if you have received any anesthetic/sedation  Six days before surgery  Check your [facility] medication instructions  Do the exercises provided to you by [facility]   Start using Chlorhexidene (CHG) body wash if prescribed  Five days before surgery  Check your [facility] medication instructions  Do the exercises provided to you by [facility]   Continue to use CHG body wash if prescribed  Three days before surgery  Check your [facility] medication instructions  Do the exercises provided to you by [facility]   Continue to use CHG body wash if prescribed  Two days before surgery  Check your [facility] medication instructions  Do the exercises provided to you by [facility]    Continue to use CHG body wash if prescribed    The Day before Surgery       Check your [facility] medication and all other [facility] instructions including when to stop eating and drinking  You will be called with your arrival time for surgery in the late afternoon.  If you do not receive a call please reach out to your surgeon's office.  Do not smoke or drink 24 hours before surgery  Prepare items to bring with you to the hospital  Shower with your chlorhexidine wash if prescribed  Brush your teeth and use your chlorhexidine dental rinse if prescribed    The Day of Surgery       Check your [facility] medication instructions  Ensure you follow the instructions for when to stop eating and drinking  Shower, if prescribed use CHG.  Do not apply any lotions, creams, moisturizers, perfume or deodorant  Brush your teeth and use your CHG dental rinse if prescribed  Wear loose comfortable clothing  Avoid make-up  Remove  jewelry and piercings, consider professional piercing removal with a plastic spacer if needed  Bring photo ID and Insurance card  Bring an accurate medication list that includes medication dose, frequency and allergies  Bring a copy of your advanced directives (will, health care power of )  Bring any devices and controllers as well as medical devices you have been provided with for surgery (CPAP, slings, braces, etc.)  Dentures, eyeglasses, and contacts will be removed before surgery, please bring cases for contacts or glasses

## 2024-06-11 NOTE — H&P (VIEW-ONLY)
CPM/PAT Evaluation       Name: Toby Olea (Toby Olea)  /Age: 1960/63 y.o.     Visit Type:   In-Person       Chief Complaint: left knee pain    HPI: Patient is a 64 yo M scheduled for left total knee arthroplasty on 24 with Dr. Loving secondary to osteoarthritis. Patient was referred by Dr. Loving to CPM today for perioperative risk stratification and optimization. Patient's PMHx is notable for HTN, HLD, GERD, prediabetes. Pt is s/p right total knee on 2024    Past Medical History:   Diagnosis Date    GERD (gastroesophageal reflux disease)     Hyperlipidemia     Hypertension 2006       Past Surgical History:   Procedure Laterality Date    APPENDECTOMY  2018    Appendectomy    COLONOSCOPY      KNEE ARTHROSCOPY W/ MENISCAL REPAIR  2018    Knee Arthroscopy With Medial Meniscus Repair    MENISCECTOMY      OTHER SURGICAL HISTORY  2018    Oral Surgery Tooth Extraction [employer] Tooth    VASECTOMY  2018    Surgery Vas Deferens Vasectomy       Patient  has no history on file for sexual activity.    Family History   Problem Relation Name Age of Onset    Alcohol abuse Mother      Deafness Mother      Hearing loss Mother      COPD Mother      Arthritis Father      Asthma Father      Hypertension Father      Stroke Maternal Grandmother         No Known Allergies    Prior to Admission medications    Medication Sig Start Date End Date Taking? Authorizing Provider   acetaminophen (Tylenol Extra Strength) 500 mg tablet Take 2 tablets (1,000 mg) by mouth every 6 hours if needed for mild pain (1 - 3). 24 Yes Alka Quevedo PA-C   aspirin 81 mg EC tablet Take 1 tablet (81 mg) by mouth 2 times a day.  Patient taking differently: Take 1 tablet (81 mg) by mouth once daily. 24 Yes Alka Quevedo PA-C   chlorthalidone (Hygroton) 50 mg tablet Take 1 tablet (50 mg) by mouth once daily. 20  Yes Historical Provider, MD   CHOLECALCIFEROL, VITAMIN D3,  ORAL Take by mouth once daily.   Yes Historical Provider, MD   co Q10-red yeast rice  mg capsule Take by mouth 2 times a day.   Yes Historical Provider, MD   losartan (Cozaar) 100 mg tablet Take 1 tablet (100 mg) by mouth once daily. 10/10/18  Yes Historical Provider, MD   metFORMIN (Glucophage) 500 mg tablet Take 1 tablet (500 mg) by mouth 2 times daily (morning and late afternoon).   Yes Historical Provider, MD   multivitamin tablet Take 1 tablet by mouth once daily.   Yes Historical Provider, MD   potassium chloride CR (Klor-Con M20) 20 mEq ER tablet Take 1 tablet (20 mEq) by mouth 2 times a day. 10/15/20  Yes Historical Provider, MD   SAW PALMETTO ORAL Take by mouth once daily.   Yes Historical Provider, MD   docusate sodium (Colace) 100 mg capsule Take 1 capsule (100 mg) by mouth 2 times a day. 5/5/24 6/5/24  Alka Quevedo PA-C   meloxicam (Mobic) 15 mg tablet Take 1 tablet (15 mg) by mouth once daily. 5/5/24 6/5/24  Alka Quevedo PA-C   pantoprazole (ProtoNix) 40 mg EC tablet Take 1 tablet (40 mg) by mouth once daily. Do not crush, chew, or split. 5/5/24 6/5/24  Alka Quevedo PA-C   docosahexaenoic acid/epa (FISH OIL ORAL) Take by mouth.  6/11/24  Historical Provider, MD   glucosamine sulfate (Glucosamine) 500 mg tablet Take by mouth.  6/11/24  Historical Provider, MD   polyethylene glycol (Glycolax, Miralax) 17 gram/dose powder Mix 1 capful (17 g) in 8 oz of water and drink by mouth once daily 5/5/24 6/11/24  Alka Quevedo PA-C   TURMERIC ORAL Take by mouth once daily.  6/11/24  Historical Provider, MD LINDSEY ROS:   Constitutional:   neg    Neuro/Psych:   neg    Eyes:   neg    Ears:   neg    Nose:   neg    Mouth:   neg    Throat:   neg    Neck:   neg    Cardio:   neg    Respiratory:   neg    Endocrine:   neg    GI:   neg    :   neg    Musculoskeletal:    +left knee pain  Hematologic:   neg    Skin:  neg        Physical Exam  Vitals and nursing note reviewed.   Constitutional:        General: He is not in acute distress.     Appearance: He is normal weight. He is not ill-appearing or toxic-appearing.   HENT:      Head: Normocephalic and atraumatic.      Right Ear: External ear normal.      Left Ear: External ear normal.      Nose: Nose normal.      Mouth/Throat:      Mouth: Mucous membranes are moist.   Eyes:      Extraocular Movements: Extraocular movements intact.      Conjunctiva/sclera: Conjunctivae normal.   Neck:      Vascular: No carotid bruit.   Cardiovascular:      Rate and Rhythm: Normal rate and regular rhythm.      Pulses: Normal pulses.      Heart sounds: Normal heart sounds.   Pulmonary:      Effort: Pulmonary effort is normal.      Breath sounds: Normal breath sounds.   Abdominal:      General: There is no distension.      Palpations: Abdomen is soft.      Tenderness: There is no abdominal tenderness.   Musculoskeletal:         General: Normal range of motion.      Cervical back: Normal range of motion.      Comments: Right knee with well healed incision, no erythema   Skin:     General: Skin is warm and dry.      Capillary Refill: Capillary refill takes less than 2 seconds.   Neurological:      General: No focal deficit present.      Mental Status: He is alert.   Psychiatric:         Mood and Affect: Mood normal.         Behavior: Behavior normal.          PAT AIRWAY:   Airway:     Mallampati::  I    Neck ROM::  Full   Cap on front left      Visit Vitals  BP (!) 146/96   Pulse 94   Temp 36.3 °C (97.4 °F) (Temporal)   Resp 16       DASI Risk Score      Flowsheet Row Most Recent Value   DASI SCORE 58.2   METS Score (Will be calculated only when all the questions are answered) 9.9          Caprini DVT Assessment      Flowsheet Row Most Recent Value   DVT Score 9   Current Status Elective major lower extremity arthroplasty   History Prior major surgery   Age 60-75 years   BMI 30 or less          Modified Frailty Index      Flowsheet Row Most Recent Value   Modified Frailty Index  Calculator .0909          CHADS2 Stroke Risk  Current as of 34 minutes ago        N/A 3 to 100%: High Risk   2 to < 3%: Medium Risk   0 to < 2%: Low Risk     Last Change: N/A          This score determines the patient's risk of having a stroke if the patient has atrial fibrillation.        This score is not applicable to this patient. Components are not calculated.          Revised Cardiac Risk Index      Flowsheet Row Most Recent Value   Revised Cardiac Risk Calculator 0          Apfel Simplified Score      Flowsheet Row Most Recent Value   Apfel Simplified Score Calculator 1          Risk Analysis Index Results This Encounter    No data found in the last 1 encounters.       Stop Bang Score      Flowsheet Row Most Recent Value   Do you snore loudly? 1   Do you often feel tired or fatigued after your sleep? 0   Has anyone ever observed you stop breathing in your sleep? 0   Do you have or are you being treated for high blood pressure? 1   Recent BMI (Calculated) 27.3   Is BMI greater than 35 kg/m2? 0=No   Age older than 50 years old? 1=Yes   Is your neck circumference greater than 17 inches (Male) or 16 inches (Female)? 0   Gender - Male 1=Yes   STOP-BANG Total Score 4            Assessment and Plan:   Neuro:  No neurologic diagnoses. Preoperative brain exercise educational handout provided to patient.    HEENT/Airway:  No diagnosis or significant findings on chart review or clinical presentation and evaluation.    Cardiovascular:    -HTN- Hygroton (continue)  -HLD- taking red rice yeast (hold)  -advised to hold asa 81mg 7 days prior to surgery    ASAII  EKG reviewed from 04/29/24 normal sinus rhythm  METS are 9.9  RCRI  0 which is 3.9% 30 day risk of MACE (risk for cardiac death, nonfatal myocardial infarction, and nonfactal cardiac arrest  LESA score which indicates a  0.2 % risk of intraoperative or 30-day postoperative MACE      Pulmonary:  No diagnosis or significant findings on chart review or clinical  presentation and evaluation.  Preoperative deep breathing educational handout provided to patient.    ARISCAT:  11    points which is a low (1.6%) risk of in-hospital post-op pulmonary complications   PRODIGY:  16  points which is a high risk of post op opioid induced respiratory depression episodes  STOP BAN   points which is a intermediate risk for moderate to severe CARLOS A    Renal:  The patient is at increased risk of perioperative renal complications secondary to age>/= 56 and HTN. Preventative measures include preoperative hydration, blood pressure control  CMP ordered today    Endocrine:    -prediabetes- on metformin (hold day of surgery) most recent A1C from 24 5.7    Hematologic:  No diagnosis or significant findings on chart review or clinical presentation and evaluation.  Preoperative DVT educational handout provided to patient.    Caprini Score:  9  points which is a high risk of perioperative VTE    Gastrointestinal:     -GERD- on protonix (continue)    Apfel: 1 points 21% risk for post operative N/V    Infectious disease:    Patient provided with CHG body wash. CHG use instructions reviewed and provided to patient. Chlorhexidine .12% Dental Rinse e-prescribed per  infection prevention protocol. Patient educated.   Patient advised to call [facility] if mouthwash not received.   Staph screen collected    Musculoskeletal:    -osteoarthritis of left knee - scheduled for surgery     Anesthesia:  No anesthesia complications  No dental issues  Nonsmoker  No personal/family issues with Anesthesia  No nickel, shell fish, or iodine allergies    Discussed with patient medication instructions, NPO guidelines, and any questions or concerns. Patient does not need further workup prior to preceding with elective surgery based on based on risk assessment.       Face to Face patient contact time 30mins    Katalina Maloney PA-C 2024 9:06 AM      Pending labs ordered:  comp and MSSA/MRSA culture  Follow up  needed: labs    Addendum: labs reviewed

## 2024-06-11 NOTE — CPM/PAT H&P
CPM/PAT Evaluation       Name: Toby Olea (Toby Olea)  /Age: 1960/63 y.o.     Visit Type:   In-Person       Chief Complaint: left knee pain    HPI: Patient is a 62 yo M scheduled for left total knee arthroplasty on 24 with Dr. Loving secondary to osteoarthritis. Patient was referred by Dr. Loving to CPM today for perioperative risk stratification and optimization. Patient's PMHx is notable for HTN, HLD, GERD, prediabetes. Pt is s/p right total knee on 2024    Past Medical History:   Diagnosis Date    GERD (gastroesophageal reflux disease)     Hyperlipidemia     Hypertension 2006       Past Surgical History:   Procedure Laterality Date    APPENDECTOMY  2018    Appendectomy    COLONOSCOPY      KNEE ARTHROSCOPY W/ MENISCAL REPAIR  2018    Knee Arthroscopy With Medial Meniscus Repair    MENISCECTOMY      OTHER SURGICAL HISTORY  2018    Oral Surgery Tooth Extraction [place] Tooth    VASECTOMY  2018    Surgery Vas Deferens Vasectomy       Patient  has no history on file for sexual activity.    Family History   Problem Relation Name Age of Onset    Alcohol abuse Mother      Deafness Mother      Hearing loss Mother      COPD Mother      Arthritis Father      Asthma Father      Hypertension Father      Stroke Maternal Grandmother         No Known Allergies    Prior to Admission medications    Medication Sig Start Date End Date Taking? Authorizing Provider   acetaminophen (Tylenol Extra Strength) 500 mg tablet Take 2 tablets (1,000 mg) by mouth every 6 hours if needed for mild pain (1 - 3). 24 Yes Alka Quevedo PA-C   aspirin 81 mg EC tablet Take 1 tablet (81 mg) by mouth 2 times a day.  Patient taking differently: Take 1 tablet (81 mg) by mouth once daily. 24 Yes Alka Quevedo PA-C   chlorthalidone (Hygroton) 50 mg tablet Take 1 tablet (50 mg) by mouth once daily. 20  Yes Historical Provider, MD   CHOLECALCIFEROL, VITAMIN D3,  ORAL Take by mouth once daily.   Yes Historical Provider, MD   co Q10-red yeast rice  mg capsule Take by mouth 2 times a day.   Yes Historical Provider, MD   losartan (Cozaar) 100 mg tablet Take 1 tablet (100 mg) by mouth once daily. 10/10/18  Yes Historical Provider, MD   metFORMIN (Glucophage) 500 mg tablet Take 1 tablet (500 mg) by mouth 2 times daily (morning and late afternoon).   Yes Historical Provider, MD   multivitamin tablet Take 1 tablet by mouth once daily.   Yes Historical Provider, MD   potassium chloride CR (Klor-Con M20) 20 mEq ER tablet Take 1 tablet (20 mEq) by mouth 2 times a day. 10/15/20  Yes Historical Provider, MD   SAW PALMETTO ORAL Take by mouth once daily.   Yes Historical Provider, MD   docusate sodium (Colace) 100 mg capsule Take 1 capsule (100 mg) by mouth 2 times a day. 5/5/24 6/5/24  Alka Quevedo PA-C   meloxicam (Mobic) 15 mg tablet Take 1 tablet (15 mg) by mouth once daily. 5/5/24 6/5/24  Alka Quevedo PA-C   pantoprazole (ProtoNix) 40 mg EC tablet Take 1 tablet (40 mg) by mouth once daily. Do not crush, chew, or split. 5/5/24 6/5/24  Alka Quevedo PA-C   docosahexaenoic acid/epa (FISH OIL ORAL) Take by mouth.  6/11/24  Historical Provider, MD   glucosamine sulfate (Glucosamine) 500 mg tablet Take by mouth.  6/11/24  Historical Provider, MD   polyethylene glycol (Glycolax, Miralax) 17 gram/dose powder Mix 1 capful (17 g) in 8 oz of water and drink by mouth once daily 5/5/24 6/11/24  Alka Quevedo PA-C   TURMERIC ORAL Take by mouth once daily.  6/11/24  Historical Provider, MD LINDSEY ROS:   Constitutional:   neg    Neuro/Psych:   neg    Eyes:   neg    Ears:   neg    Nose:   neg    Mouth:   neg    Throat:   neg    Neck:   neg    Cardio:   neg    Respiratory:   neg    Endocrine:   neg    GI:   neg    :   neg    Musculoskeletal:    +left knee pain  Hematologic:   neg    Skin:  neg        Physical Exam  Vitals and nursing note reviewed.   Constitutional:        General: He is not in acute distress.     Appearance: He is normal weight. He is not ill-appearing or toxic-appearing.   HENT:      Head: Normocephalic and atraumatic.      Right Ear: External ear normal.      Left Ear: External ear normal.      Nose: Nose normal.      Mouth/Throat:      Mouth: Mucous membranes are moist.   Eyes:      Extraocular Movements: Extraocular movements intact.      Conjunctiva/sclera: Conjunctivae normal.   Neck:      Vascular: No carotid bruit.   Cardiovascular:      Rate and Rhythm: Normal rate and regular rhythm.      Pulses: Normal pulses.      Heart sounds: Normal heart sounds.   Pulmonary:      Effort: Pulmonary effort is normal.      Breath sounds: Normal breath sounds.   Abdominal:      General: There is no distension.      Palpations: Abdomen is soft.      Tenderness: There is no abdominal tenderness.   Musculoskeletal:         General: Normal range of motion.      Cervical back: Normal range of motion.      Comments: Right knee with well healed incision, no erythema   Skin:     General: Skin is warm and dry.      Capillary Refill: Capillary refill takes less than 2 seconds.   Neurological:      General: No focal deficit present.      Mental Status: He is alert.   Psychiatric:         Mood and Affect: Mood normal.         Behavior: Behavior normal.          PAT AIRWAY:   Airway:     Mallampati::  I    Neck ROM::  Full   Cap on front left      Visit Vitals  BP (!) 146/96   Pulse 94   Temp 36.3 °C (97.4 °F) (Temporal)   Resp 16       DASI Risk Score      Flowsheet Row Most Recent Value   DASI SCORE 58.2   METS Score (Will be calculated only when all the questions are answered) 9.9          Caprini DVT Assessment      Flowsheet Row Most Recent Value   DVT Score 9   Current Status Elective major lower extremity arthroplasty   History Prior major surgery   Age 60-75 years   BMI 30 or less          Modified Frailty Index      Flowsheet Row Most Recent Value   Modified Frailty Index  Calculator .0909          CHADS2 Stroke Risk  Current as of 34 minutes ago        N/A 3 to 100%: High Risk   2 to < 3%: Medium Risk   0 to < 2%: Low Risk     Last Change: N/A          This score determines the patient's risk of having a stroke if the patient has atrial fibrillation.        This score is not applicable to this patient. Components are not calculated.          Revised Cardiac Risk Index      Flowsheet Row Most Recent Value   Revised Cardiac Risk Calculator 0          Apfel Simplified Score      Flowsheet Row Most Recent Value   Apfel Simplified Score Calculator 1          Risk Analysis Index Results This Encounter    No data found in the last 1 encounters.       Stop Bang Score      Flowsheet Row Most Recent Value   Do you snore loudly? 1   Do you often feel tired or fatigued after your sleep? 0   Has anyone ever observed you stop breathing in your sleep? 0   Do you have or are you being treated for high blood pressure? 1   Recent BMI (Calculated) 27.3   Is BMI greater than 35 kg/m2? 0=No   Age older than 50 years old? 1=Yes   Is your neck circumference greater than 17 inches (Male) or 16 inches (Female)? 0   Gender - Male 1=Yes   STOP-BANG Total Score 4            Assessment and Plan:   Neuro:  No neurologic diagnoses. Preoperative brain exercise educational handout provided to patient.    HEENT/Airway:  No diagnosis or significant findings on chart review or clinical presentation and evaluation.    Cardiovascular:    -HTN- Hygroton (continue)  -HLD- taking red rice yeast (hold)  -advised to hold asa 81mg 7 days prior to surgery    ASAII  EKG reviewed from 04/29/24 normal sinus rhythm  METS are 9.9  RCRI  0 which is 3.9% 30 day risk of MACE (risk for cardiac death, nonfatal myocardial infarction, and nonfactal cardiac arrest  LESA score which indicates a  0.2 % risk of intraoperative or 30-day postoperative MACE      Pulmonary:  No diagnosis or significant findings on chart review or clinical  presentation and evaluation.  Preoperative deep breathing educational handout provided to patient.    ARISCAT:  11    points which is a low (1.6%) risk of in-hospital post-op pulmonary complications   PRODIGY:  16  points which is a high risk of post op opioid induced respiratory depression episodes  STOP BAN   points which is a intermediate risk for moderate to severe CARLOS A    Renal:  The patient is at increased risk of perioperative renal complications secondary to age>/= 56 and HTN. Preventative measures include preoperative hydration, blood pressure control  CMP ordered today    Endocrine:    -prediabetes- on metformin (hold day of surgery) most recent A1C from 24 5.7    Hematologic:  No diagnosis or significant findings on chart review or clinical presentation and evaluation.  Preoperative DVT educational handout provided to patient.    Caprini Score:  9  points which is a high risk of perioperative VTE    Gastrointestinal:     -GERD- on protonix (continue)    Apfel: 1 points 21% risk for post operative N/V    Infectious disease:    Patient provided with CHG body wash. CHG use instructions reviewed and provided to patient. Chlorhexidine .12% Dental Rinse e-prescribed per  infection prevention protocol. Patient educated.   Patient advised to call [facility] if mouthwash not received.   Staph screen collected    Musculoskeletal:    -osteoarthritis of left knee - scheduled for surgery     Anesthesia:  No anesthesia complications  No dental issues  Nonsmoker  No personal/family issues with Anesthesia  No nickel, shell fish, or iodine allergies    Discussed with patient medication instructions, NPO guidelines, and any questions or concerns. Patient does not need further workup prior to preceding with elective surgery based on based on risk assessment.       Face to Face patient contact time 30mins    Katalina Maloney PA-C 2024 9:06 AM      Pending labs ordered:  comp and MSSA/MRSA culture  Follow up  needed: labs    Addendum: labs reviewed

## 2024-06-13 ENCOUNTER — TREATMENT (OUTPATIENT)
Dept: PHYSICAL THERAPY | Facility: CLINIC | Age: 64
End: 2024-06-13
Payer: MEDICARE

## 2024-06-13 DIAGNOSIS — Z96.651 S/P TKR (TOTAL KNEE REPLACEMENT) USING CEMENT, RIGHT: ICD-10-CM

## 2024-06-13 DIAGNOSIS — Z96.651 S/P TKR (TOTAL KNEE REPLACEMENT), RIGHT: ICD-10-CM

## 2024-06-13 DIAGNOSIS — Z74.09 IMPAIRED FUNCTIONAL MOBILITY, BALANCE, GAIT, AND ENDURANCE: ICD-10-CM

## 2024-06-13 PROCEDURE — 97110 THERAPEUTIC EXERCISES: CPT | Mod: GP

## 2024-06-13 NOTE — PROGRESS NOTES
"  Physical Therapy Treatment    Patient Name: Toby Olea  MRN: [id number]    Today's Date: 6/13/2024  Visit #5   36  units by 8/1/24 = 9 visits       Referred by: Alka Quevedo        Diagnosis:   1. S/P TKR (total knee replacement), right  Follow Up In Physical Therapy      2. S/P TKR (total knee replacement) using cement, right  Follow Up In Physical Therapy      3. Impaired functional mobility, balance, gait, and endurance  Follow Up In Physical Therapy          PRECAUTIONS:  HTN, Left knee OA    SUBJECTIVE:  Doing ok. Mild soreness in the knee. \"Its OK\"    OBJECTIVE:  Requires cues for sequencing, posture, and reduced speed.     TREATMENT:  - Therex:  Bike level 3 8 min.     Stair stretches - hip flex, hamstring, gastroc 3 x 20 sec eze     TG x 25    X 20 eze:  Step up 8 inch  Step lateral 8 inch    Air ex x 20 eze:  CR  Hip abd  Hip extension   Step tap anterior   Step overs    NBOS activity 30 sec x 3 eze   SLS activity   Modified SLS     Supine 2 x 15 eze:  QS with SLR  SAQ 3#  Bridge  LAQ 3#  S/l abduction   QS with 5 sec hold     - - Modalities:      CP x 10  min supine in extension     ASSESSMENT:   Pt marco well with cues for technique. Pt able to improve quality of movement today. Reliance on UE for balance. Pt limited by pain in LLE at this time. Pt is progressing thru protocol well. Improved QS. Pt has tendency to forward flex at the waist during all sagital plane activity. Fatigue end of session. Challenged with SLR technique.       PLAN:    Continue per POC.       Billing:                          "

## 2024-06-14 LAB — STAPHYLOCOCCUS SPEC CULT: ABNORMAL

## 2024-06-17 ENCOUNTER — TELEPHONE (OUTPATIENT)
Dept: ORTHOPEDIC SURGERY | Facility: HOSPITAL | Age: 64
End: 2024-06-17

## 2024-06-17 ENCOUNTER — TREATMENT (OUTPATIENT)
Dept: PHYSICAL THERAPY | Facility: CLINIC | Age: 64
End: 2024-06-17
Payer: MEDICARE

## 2024-06-17 DIAGNOSIS — Z74.09 IMPAIRED FUNCTIONAL MOBILITY, BALANCE, GAIT, AND ENDURANCE: ICD-10-CM

## 2024-06-17 DIAGNOSIS — Z96.651 S/P TKR (TOTAL KNEE REPLACEMENT), RIGHT: ICD-10-CM

## 2024-06-17 DIAGNOSIS — Z96.651 S/P TKR (TOTAL KNEE REPLACEMENT) USING CEMENT, RIGHT: ICD-10-CM

## 2024-06-17 PROCEDURE — 97110 THERAPEUTIC EXERCISES: CPT | Mod: GP | Performed by: PHYSICAL THERAPIST

## 2024-06-17 NOTE — TELEPHONE ENCOUNTER
Thank you for taking my call today.  All questions were answered at the time of the call, but please feel free to reach out to me via MyChart or phone, [phone], with any new questions or concerns.       We confirmed that you opted to enroll in our [id number] program so your discharge prescriptions will be available to take home at the time of discharge.  Please bring any prescription insurance coverage with you on the morning of surgery so that we can enter the information into our system.     We confirmed that your plan would be to Stay Overnight.    We confirmed that you have DME needed for recovery.     Use the provided body wash for 4 days before surgery and complete a 5th shower on the morning of surgery, this includes your body and hair.  Follow the directions as provided during preadmission testing.  The mouth wash will be used the night before and the morning of surgery.       As a reminder, if you do not hear from our team, please call [phone] between 2pm and 3pm the business day before your surgery to confirm your arrival time and details.    Please don't hesitate to reach out with additional questions or concerns.    Denise Singer MBA, BSN, RN-BC  ONESIMO HolleyN, RN  [facility]  [facility]  [phone]

## 2024-06-17 NOTE — PROGRESS NOTES
"  Physical Therapy Treatment    Patient Name: Toby Olea  MRN: [id number]    Today's Date: 6/17/2024  Visit #6   36  units by 8/1/24 = 9 visits       Referred by: Alka Quevedo   Time Calculation  Start Time: 1109  Stop Time: 1204  Time Calculation (min): 55 min    Diagnosis:   1. S/P TKR (total knee replacement), right  Follow Up In Physical Therapy      2. S/P TKR (total knee replacement) using cement, right  Follow Up In Physical Therapy      3. Impaired functional mobility, balance, gait, and endurance  Follow Up In Physical Therapy          PRECAUTIONS:  HTN, Left knee OA    SUBJECTIVE:  Patient without much complaint in the (R) knee. He reports some muscular soreness in the legs and feet from walking on his treadmill. Reports that his (L) knee will be replaced next week.    OBJECTIVE:  Requires cues for sequencing, posture, and reduced speed.     TREATMENT:  - Therex:  Bike level 10, 15 min.     Stair stretches - hip flex, hamstring, gastroc 3 x 20 sec eze     TG L7, +2 yellow cords, 3 x 10  Step up 9 inch x25 RLE  Heel tap 6 inch x25 RLE  Hip Flex/Abd/Ext, YTB 2x10 each  SLS Airex 30\"x3 RLE    Supine 2 x 15 eze:  QS with SLR 2# RLE  SAQ 5#  Bridge  LAQ 5#    - - Modalities:    CP x 10  min supine in extension     ASSESSMENT:   Pt has exhibited near full return of function to the (R)LE. He exhibits mild deficit with strength of the RLE. His (L) knee OA pain is more limiting with functional tasks including stair descent.       PLAN:   DC next visit from (R) knee, then initiate care for the (L) knee s/p TKA.      Billing:     PT Therapeutic Procedures Time Entry  Therapeutic Exercise Time Entry: 45  PT Modalities Time Entry  Hot/Cold Pack Time Entry: 10                 "

## 2024-06-20 ENCOUNTER — OFFICE VISIT (OUTPATIENT)
Dept: ORTHOPEDIC SURGERY | Facility: CLINIC | Age: 64
End: 2024-06-20
Payer: MEDICARE

## 2024-06-20 ENCOUNTER — HOSPITAL ENCOUNTER (OUTPATIENT)
Dept: RADIOLOGY | Facility: CLINIC | Age: 64
Discharge: HOME | End: 2024-06-20
Payer: MEDICARE

## 2024-06-20 VITALS — BODY MASS INDEX: 27.18 KG/M2 | WEIGHT: 229 LBS

## 2024-06-20 DIAGNOSIS — Z96.651 S/P TKR (TOTAL KNEE REPLACEMENT), RIGHT: ICD-10-CM

## 2024-06-20 PROCEDURE — 99024 POSTOP FOLLOW-UP VISIT: CPT | Performed by: PHYSICIAN ASSISTANT

## 2024-06-20 PROCEDURE — 73562 X-RAY EXAM OF KNEE 3: CPT | Mod: RT

## 2024-06-20 ASSESSMENT — PAIN SCALES - GENERAL: PAINLEVEL: 0-NO PAIN

## 2024-06-20 NOTE — PROGRESS NOTES
KAREY Cardona, SHE, ATC  Adult Reconstruction and Joint Replacement Surgery  Phone: [phone]     Fax:[phone]            Chief Complaint   Patient presents with    Right Knee - Post-op       HPI:  Toby Olea is a pleasant 63 y.o. year-old male here for follow-up of their side: right total knee arthroplasty by Dr. Loving.  The patient is approximately 6 week(s) postop.The patient has no mechanical symptoms.  The patient has mild swelling and pain.   The patients wound has healed uneventfully.  The patient has been doing HEP and/or outpatient PT.  No complications postoperatively.  He is scheduled for left total knee arthroplasty on Monday.    Review of Systems  Past Medical History:   Diagnosis Date    GERD (gastroesophageal reflux disease)     Hyperlipidemia     Hypertension 04/01/2006     Patient Active Problem List   Diagnosis    Benign essential hypertension    Elevated glucose    High calcium levels    Hyperlipidemia    Hypokalemia    Skin lesion of back    BMI 28.0-28.9,adult    Unilateral primary osteoarthritis, right knee    Localized osteoarthritis of right knee    Unilateral primary osteoarthritis, left knee    S/P TKR (total knee replacement), right    Impaired functional mobility, balance, gait, and endurance     Medication Documentation Review Audit       Reviewed by MARLA Cassidy (Patient Care Technician) on 06/20/24 at 1013      Medication Order Taking? Sig Documenting Provider Last Dose Status   chlorhexidine (Peridex) 0.12 % solution [id number]  Swish and spit 15 mL night before surgery and morning of surgery Katalina Maloney PA-C  Active   chlorthalidone (Hygroton) 50 mg tablet [id number] No Take 1 tablet (50 mg) by mouth once daily. Historical Provider, MD 6/11/2024 Active   CHOLECALCIFEROL, VITAMIN D3, ORAL [id number] No Take by mouth once daily. Historical Provider, MD 6/11/2024 Active   co Q10-red yeast rice  mg capsule [id number] No Take by mouth 2  times a day. Lawrence Sandoval MD 2024 Active   losartan (Cozaar) 100 mg tablet [id number] No Take 1 tablet (100 mg) by mouth once daily. Lawrence Sandoval MD 2024 Active   metFORMIN (Glucophage) 500 mg tablet [id number] No Take 1 tablet (500 mg) by mouth 2 times daily (morning and late afternoon). Lawrence Sandoval MD 2024 Active   multivitamin tablet [id number] No Take 1 tablet by mouth once daily. Lawrence Sandoval MD 2024 Active   pantoprazole (ProtoNix) 40 mg EC tablet [id number] No Take 1 tablet (40 mg) by mouth once daily. Do not crush, chew, or split. Alka Quevedo PA-C Taking  24 2359   potassium chloride CR (Klor-Con M20) 20 mEq ER tablet [id number] No Take 1 tablet (20 mEq) by mouth 2 times a day. Lawrence Sandoval MD 2024 Active   SAW PALMETTO ORAL [id number] No Take by mouth once daily. Historical Provider, MD 2024 Active                  No Known Allergies  Social History     Socioeconomic History    Marital status:      Spouse name: Not on file    Number of children: Not on file    Years of education: Not on file    Highest education level: Not on file   Occupational History    Not on file   Tobacco Use    Smoking status: Never    Smokeless tobacco: Never   Vaping Use    Vaping status: Never Used   Substance and Sexual Activity    Alcohol use: Not Currently     Alcohol/week: 3.0 standard drinks of alcohol     Types: 3 Standard drinks or equivalent per week    Drug use: Never    Sexual activity: Not on file   Other Topics Concern    Not on file   Social History Narrative    Not on file     Social Determinants of Health     Financial Resource Strain: Not on file   Food Insecurity: Not on file   Transportation Needs: No Transportation Needs (2024)    OASIS : Transportation     Lack of Transportation (Medical): No     Lack of Transportation (Non-Medical): No     Patient Unable or Declines to Respond: No   Physical Activity: Not on file    Stress: Not on file   Social Connections: Feeling Socially Integrated (5/20/2024)    OASIS : Social Isolation     Frequency of experiencing loneliness or isolation: Never   Intimate Partner Violence: Not on file   Housing Stability: Not on file     Past Surgical History:   Procedure Laterality Date    APPENDECTOMY  09/04/2018    Appendectomy    COLONOSCOPY      KNEE ARTHROSCOPY W/ MENISCAL REPAIR  09/04/2018    Knee Arthroscopy With Medial Meniscus Repair    MENISCECTOMY      OTHER SURGICAL HISTORY  09/04/2018    Oral Surgery Tooth Extraction [place] Tooth    VASECTOMY  09/04/2018    Surgery Vas Deferens Vasectomy       Physical Exam  side: right Knee  There were no vitals filed for this visit.  AxO x 3 in NAD.   Assistive Device: no device. Coordination and balance intact.  Normal bilateral upper and lower extremities.  No erythema, ecchymosis, temperature changes. No popliteal lymphadenopathy,  No overlying lesion  Mood: euthymic  Respirations non-labored  The incision is midline healing well with no signs of surrounding infection, dehiscence or drainage.   Neurovascular exam is at baseline.  No instability varus or valgus stressing the knee at 0, 30 or 60 degrees.  No instability in the AP plane at 90 degrees.  Range of motion: 0 degrees extension, 110 degrees flexion  5/5 hip flexion/knee extension/DF/PF/EHL  SILT in leonidas/saph/ per/tib distribution   Extremities warm and well perfused.  No lower extremity calf tenderness, warmth or swelling. Lower extremity well perfused  2+ Femoral/DP/PT pulses bilaterally    Imaging:  No images are attached to the encounter.    I personally reviewed multiple views of the knee today in clinic. Status post side: right Total Knee aArthroplasty. The implant is well fixed, well aligned.  No evidence of abelardo-implant fracture, lucency or dislocation.    Impression/Plan:  Toby Olea is doing well post-operatively and happy with the results of the operation.     S/P side: right  Total Knee Arthroplasty  I talked with patient at length about activity precautions and progression of activities. The patient understands their permanent precautions. At this time, you may gradually increase your activities and get back to a normal, low-impact lifestyle. Please avoid running, jumping, and heavy lifting.      3. Continue HEP or outpatient PT, per protocol.    4. Continue Post-operative instructions.    5. Discussed the importance of dental prophylactic dental antibiotics lifelong. Patient may request medication refill through MyChart,       Pharmacy or surgeons office.    All questions answered.    Follow-up 1 year with x-rays at next visit.    KAREY Cardona, PA-C, ATC  Orthopedic Physician Assisant  Adult Reconstruction and Total Joint Replacement  General Orthopedics  Department of Orthopaedic Surgery  [place] [address]  [employer] messaging preferred

## 2024-06-21 ENCOUNTER — TREATMENT (OUTPATIENT)
Dept: PHYSICAL THERAPY | Facility: CLINIC | Age: 64
End: 2024-06-21
Payer: MEDICARE

## 2024-06-21 ENCOUNTER — ANESTHESIA EVENT (OUTPATIENT)
Dept: OPERATING ROOM | Facility: HOSPITAL | Age: 64
End: 2024-06-21
Payer: MEDICARE

## 2024-06-21 DIAGNOSIS — Z96.651 S/P TKR (TOTAL KNEE REPLACEMENT) USING CEMENT, RIGHT: ICD-10-CM

## 2024-06-21 DIAGNOSIS — Z96.651 S/P TKR (TOTAL KNEE REPLACEMENT), RIGHT: ICD-10-CM

## 2024-06-21 DIAGNOSIS — Z74.09 IMPAIRED FUNCTIONAL MOBILITY, BALANCE, GAIT, AND ENDURANCE: ICD-10-CM

## 2024-06-21 PROCEDURE — 97110 THERAPEUTIC EXERCISES: CPT | Mod: GP | Performed by: PHYSICAL THERAPIST

## 2024-06-21 NOTE — ANESTHESIA PREPROCEDURE EVALUATION
Patient: Toby Olea    Procedure Information       Date/Time: 06/24/24 0700    Procedure: Knee Replacement Total Cement Unilat ( DePuy Attune Knee, Pineapple [place] ) *Rapid recovery* (Left: Knee) - DePuy Attune Knee, Pineapple [place]    Location: [facility] / [facility]    Surgeons: Christian Loving MD            Relevant Problems   Anesthesia (within normal limits)      Cardiac   (+) Benign essential hypertension   (+) Hyperlipidemia      Pulmonary (within normal limits)      Neuro (within normal limits)      GI (within normal limits)      /Renal (within normal limits)      Liver (within normal limits)      Endocrine (within normal limits)      Hematology (within normal limits)      Musculoskeletal   (+) Localized osteoarthritis of right knee   (+) Unilateral primary osteoarthritis, left knee   (+) Unilateral primary osteoarthritis, right knee      HEENT (within normal limits)      ID (within normal limits)      Skin (within normal limits)      GYN (within normal limits)       Clinical information reviewed:               No Known Allergies  Prior to Admission medications    Medication Sig Start Date End Date Taking? Authorizing Provider   chlorhexidine (Peridex) 0.12 % solution Swish and spit 15 mL night before surgery and morning of surgery 6/11/24   Katalina Maloney PA-C   chlorthalidone (Hygroton) 50 mg tablet Take 1 tablet (50 mg) by mouth once daily. 6/16/20   Historical Provider, MD   CHOLECALCIFEROL, VITAMIN D3, ORAL Take by mouth once daily.    Historical Provider, MD   co Q10-red yeast rice  mg capsule Take by mouth 2 times a day.    Historical Provider, MD   losartan (Cozaar) 100 mg tablet Take 1 tablet (100 mg) by mouth once daily. 10/10/18   Historical Provider, MD   metFORMIN (Glucophage) 500 mg tablet Take 1 tablet (500 mg) by mouth 2 times daily (morning and late afternoon).    Historical Provider, MD   multivitamin tablet Take 1 tablet by mouth once daily.    Historical Provider, MD    pantoprazole (ProtoNix) 40 mg EC tablet Take 1 tablet (40 mg) by mouth once daily. Do not crush, chew, or split. 5/5/24 6/5/24  Alka Quevedo PA-C   potassium chloride CR (Klor-Con M20) 20 mEq ER tablet Take 1 tablet (20 mEq) by mouth 2 times a day. 10/15/20   Historical Provider, MD   SAW PALMETTO ORAL Take by mouth once daily.    Historical Provider, MD     Past Medical History:   Diagnosis Date    GERD (gastroesophageal reflux disease)     Hyperlipidemia     Hypertension 04/01/2006     Past Surgical History:   Procedure Laterality Date    APPENDECTOMY  09/04/2018    Appendectomy    COLONOSCOPY      KNEE ARTHROSCOPY W/ MENISCAL REPAIR  09/04/2018    Knee Arthroscopy With Medial Meniscus Repair    MENISCECTOMY      OTHER SURGICAL HISTORY  09/04/2018    Oral Surgery Tooth Extraction [place] Tooth    VASECTOMY  09/04/2018    Surgery Vas Deferens Vasectomy         NPO Detail:  No data recorded     Physical Exam    Airway  Mallampati: I  TM distance: >3 FB  Neck ROM: full     Cardiovascular - normal exam     Dental - normal exam       Pulmonary - normal exam     Abdominal - normal exam             Anesthesia Plan    History of general anesthesia?: yes  History of complications of general anesthesia?: no    ASA 3     regional and spinal   (Adductor canal block)  The patient is not a current smoker.  Patient was not previously instructed to abstain from smoking on day of procedure.  Patient did not smoke on day of procedure.  Education provided regarding risk of obstructive sleep apnea.  intravenous induction   Anesthetic plan and risks discussed with patient.    Plan discussed with CRNA.

## 2024-06-21 NOTE — PROGRESS NOTES
"  Physical Therapy Discharge/Treatment    Patient Name: Toby Olea  MRN: [id number]  Today's Date: 6/21/2024  Visit #7   36  units by 8/1/24 = 9 visits       Referred by: Alka Quevedo   Time Calculation  Start Time: 1100  Stop Time: 1140  Time Calculation (min): 40 min    Diagnosis:   1. S/P TKR (total knee replacement), right  Follow Up In Physical Therapy      2. S/P TKR (total knee replacement) using cement, right  Follow Up In Physical Therapy      3. Impaired functional mobility, balance, gait, and endurance  Follow Up In Physical Therapy          PRECAUTIONS:  HTN, Left knee OA    SUBJECTIVE:  Patient reports seeing the PA yesterday whom was pleased with the progress on his (R) knee. She also used this visit for a pre-surgical visit on the (L) knee as patient will undergo TKA. Has some muscle soreness from working out yesterday.    OBJECTIVE:  Knee AROM: (degrees) Left Right   Flexion 119 120   Extension 3 4     Knee PROM: (degrees) Left Right   Flexion  124   Extension  0     Knee Strength: MMT Left Right   Flexion 4+/5 4/5   Extension 4+/5 4/5     (R) knee ext MMT: 5/5  (R) knee flex MMT: 5/5    SLS >20 sec    Gait: Minor lack of TKE LLE    TREATMENT:  - Therex:  Bike level 10, 10 min.     Stair stretches - hip flex, hamstring, gastroc 3 x 20 sec eze     Step up 9 inch x25 RLE  Heel tap 6 inch x25 RLE  Hip Flex/Abd/Ext, YTB 2x10 each  SLS Airex 30\"x3 RLE    Reassessment    ASSESSMENT:  Pt has exhibited full return of function to the (R)LE. His ROM and strength have been restored. He presents with no functional limitations with regard to the (R) knee and therefore can be discharged.      PLAN:   DC from (R) knee, then initiate care for the (L) knee s/p TKA.  Resolved       PT Problem       PT Goal 1 (Met)       Start:  05/28/24    Expected End:  07/12/24    Resolved:  06/21/24    1) Pain will be reduced to 0/10 at rest no more than 2/10 with activity.   2) Function will be increased to be able to complete " household activity, biking, and walking in community safely unrestricted by pain.   3) ROM will be increased to be WNL at 0-120  4) Strength to be increased to be WNL at 5/5  5) Independent in Home Exercise Program  6) Independent return to   7) Outcome tool improvement to LEFS 60/80 or greater  8) SLS 10 sec eze, NBOS 20 sec eze   9) recp stair ambulation 1 HR non antalgically x 12, ambulation non antalgically with good quality of motion.                Patient Stated Goal 1 (Met)       Start:  05/28/24    Expected End:  07/12/24    Resolved:  06/21/24    Get better                Billing:     PT Therapeutic Procedures Time Entry  Therapeutic Exercise Time Entry: 40

## 2024-06-24 ENCOUNTER — PHARMACY VISIT (OUTPATIENT)
Dept: PHARMACY | Facility: CLINIC | Age: 64
End: 2024-06-24
Payer: COMMERCIAL

## 2024-06-24 ENCOUNTER — ANESTHESIA (OUTPATIENT)
Dept: OPERATING ROOM | Facility: HOSPITAL | Age: 64
End: 2024-06-24
Payer: MEDICARE

## 2024-06-24 ENCOUNTER — DOCUMENTATION (OUTPATIENT)
Dept: HOME HEALTH SERVICES | Facility: HOME HEALTH | Age: 64
End: 2024-06-24

## 2024-06-24 ENCOUNTER — HOSPITAL ENCOUNTER (OUTPATIENT)
Facility: HOSPITAL | Age: 64
Discharge: HOME | End: 2024-06-24
Attending: ORTHOPAEDIC SURGERY | Admitting: ORTHOPAEDIC SURGERY
Payer: MEDICARE

## 2024-06-24 ENCOUNTER — HOME HEALTH ADMISSION (OUTPATIENT)
Dept: HOME HEALTH SERVICES | Facility: HOME HEALTH | Age: 64
End: 2024-06-24
Payer: MEDICARE

## 2024-06-24 VITALS
SYSTOLIC BLOOD PRESSURE: 142 MMHG | TEMPERATURE: 96.8 F | HEART RATE: 64 BPM | DIASTOLIC BLOOD PRESSURE: 84 MMHG | WEIGHT: 227.74 LBS | BODY MASS INDEX: 27.73 KG/M2 | RESPIRATION RATE: 16 BRPM | OXYGEN SATURATION: 97 % | HEIGHT: 76 IN

## 2024-06-24 DIAGNOSIS — M17.12 UNILATERAL PRIMARY OSTEOARTHRITIS, LEFT KNEE: ICD-10-CM

## 2024-06-24 DIAGNOSIS — Z96.652 S/P TKR (TOTAL KNEE REPLACEMENT) USING CEMENT, LEFT: Primary | ICD-10-CM

## 2024-06-24 LAB — GLUCOSE BLD MANUAL STRIP-MCNC: 106 MG/DL (ref 74–99)

## 2024-06-24 PROCEDURE — RXMED WILLOW AMBULATORY MEDICATION CHARGE

## 2024-06-24 PROCEDURE — 64447 NJX AA&/STRD FEMORAL NRV IMG: CPT | Performed by: ANESTHESIOLOGY

## 2024-06-24 PROCEDURE — 7100000002 HC RECOVERY ROOM TIME - EACH INCREMENTAL 1 MINUTE: Performed by: ORTHOPAEDIC SURGERY

## 2024-06-24 PROCEDURE — 7100000009 HC PHASE TWO TIME - INITIAL BASE CHARGE: Performed by: ORTHOPAEDIC SURGERY

## 2024-06-24 PROCEDURE — 3600000010 HC OR TIME - EACH INCREMENTAL 1 MINUTE - PROCEDURE LEVEL FIVE: Performed by: ORTHOPAEDIC SURGERY

## 2024-06-24 PROCEDURE — 3700000002 HC GENERAL ANESTHESIA TIME - EACH INCREMENTAL 1 MINUTE: Performed by: ORTHOPAEDIC SURGERY

## 2024-06-24 PROCEDURE — 97530 THERAPEUTIC ACTIVITIES: CPT | Mod: GP

## 2024-06-24 PROCEDURE — C1776 JOINT DEVICE (IMPLANTABLE): HCPCS | Performed by: ORTHOPAEDIC SURGERY

## 2024-06-24 PROCEDURE — 7100000011 HC EXTENDED STAY RECOVERY HOURLY - NURSING UNIT

## 2024-06-24 PROCEDURE — 2500000002 HC RX 250 W HCPCS SELF ADMINISTERED DRUGS (ALT 637 FOR MEDICARE OP, ALT 636 FOR OP/ED): Performed by: STUDENT IN AN ORGANIZED HEALTH CARE EDUCATION/TRAINING PROGRAM

## 2024-06-24 PROCEDURE — 7100000010 HC PHASE TWO TIME - EACH INCREMENTAL 1 MINUTE: Performed by: ORTHOPAEDIC SURGERY

## 2024-06-24 PROCEDURE — 2500000004 HC RX 250 GENERAL PHARMACY W/ HCPCS (ALT 636 FOR OP/ED): Performed by: ANESTHESIOLOGY

## 2024-06-24 PROCEDURE — 94760 N-INVAS EAR/PLS OXIMETRY 1: CPT | Mod: 59

## 2024-06-24 PROCEDURE — 3600000005 HC OR TIME - INITIAL BASE CHARGE - PROCEDURE LEVEL FIVE: Performed by: ORTHOPAEDIC SURGERY

## 2024-06-24 PROCEDURE — 82947 ASSAY GLUCOSE BLOOD QUANT: CPT

## 2024-06-24 PROCEDURE — 7100000001 HC RECOVERY ROOM TIME - INITIAL BASE CHARGE: Performed by: ORTHOPAEDIC SURGERY

## 2024-06-24 PROCEDURE — A27447 PR TOTAL KNEE ARTHROPLASTY: Performed by: ANESTHESIOLOGY

## 2024-06-24 PROCEDURE — A4649 SURGICAL SUPPLIES: HCPCS | Performed by: ORTHOPAEDIC SURGERY

## 2024-06-24 PROCEDURE — A27447 PR TOTAL KNEE ARTHROPLASTY: Performed by: NURSE ANESTHETIST, CERTIFIED REGISTERED

## 2024-06-24 PROCEDURE — 2500000001 HC RX 250 WO HCPCS SELF ADMINISTERED DRUGS (ALT 637 FOR MEDICARE OP): Performed by: PHYSICIAN ASSISTANT

## 2024-06-24 PROCEDURE — 27447 TOTAL KNEE ARTHROPLASTY: CPT | Performed by: ORTHOPAEDIC SURGERY

## 2024-06-24 PROCEDURE — C1713 ANCHOR/SCREW BN/BN,TIS/BN: HCPCS | Performed by: ORTHOPAEDIC SURGERY

## 2024-06-24 PROCEDURE — 2500000004 HC RX 250 GENERAL PHARMACY W/ HCPCS (ALT 636 FOR OP/ED): Mod: JZ | Performed by: PHYSICIAN ASSISTANT

## 2024-06-24 PROCEDURE — 2500000005 HC RX 250 GENERAL PHARMACY W/O HCPCS: Performed by: PHYSICIAN ASSISTANT

## 2024-06-24 PROCEDURE — 2500000004 HC RX 250 GENERAL PHARMACY W/ HCPCS (ALT 636 FOR OP/ED): Performed by: ORTHOPAEDIC SURGERY

## 2024-06-24 PROCEDURE — 97161 PT EVAL LOW COMPLEX 20 MIN: CPT | Mod: GP

## 2024-06-24 PROCEDURE — 2500000004 HC RX 250 GENERAL PHARMACY W/ HCPCS (ALT 636 FOR OP/ED): Performed by: NURSE ANESTHETIST, CERTIFIED REGISTERED

## 2024-06-24 PROCEDURE — 2720000007 HC OR 272 NO HCPCS: Performed by: ORTHOPAEDIC SURGERY

## 2024-06-24 PROCEDURE — 97110 THERAPEUTIC EXERCISES: CPT | Mod: GP

## 2024-06-24 PROCEDURE — 2500000005 HC RX 250 GENERAL PHARMACY W/O HCPCS: Performed by: NURSE ANESTHETIST, CERTIFIED REGISTERED

## 2024-06-24 PROCEDURE — 2780000003 HC OR 278 NO HCPCS: Performed by: ORTHOPAEDIC SURGERY

## 2024-06-24 PROCEDURE — 27447 TOTAL KNEE ARTHROPLASTY: CPT | Performed by: PHYSICIAN ASSISTANT

## 2024-06-24 PROCEDURE — 2500000004 HC RX 250 GENERAL PHARMACY W/ HCPCS (ALT 636 FOR OP/ED): Performed by: PHYSICIAN ASSISTANT

## 2024-06-24 PROCEDURE — 3700000001 HC GENERAL ANESTHESIA TIME - INITIAL BASE CHARGE: Performed by: ORTHOPAEDIC SURGERY

## 2024-06-24 PROCEDURE — 2500000001 HC RX 250 WO HCPCS SELF ADMINISTERED DRUGS (ALT 637 FOR MEDICARE OP): Performed by: ANESTHESIOLOGY

## 2024-06-24 PROCEDURE — 97116 GAIT TRAINING THERAPY: CPT | Mod: GP

## 2024-06-24 DEVICE — TIBAL BASE ATTUNE FB, SZ 7 CEM: Type: IMPLANTABLE DEVICE | Site: KNEE | Status: FUNCTIONAL

## 2024-06-24 DEVICE — ATTUNE PATELLA MEDIALIZED DOME 41MM CEMENTED AOX
Type: IMPLANTABLE DEVICE | Site: KNEE | Status: FUNCTIONAL
Brand: ATTUNE

## 2024-06-24 DEVICE — SMARTSET HV HIGH VISCOSITY BONE CEMENT 40G
Type: IMPLANTABLE DEVICE | Site: KNEE | Status: FUNCTIONAL
Brand: SMARTSET

## 2024-06-24 DEVICE — ATTUNE KNEE SYSTEM TIBIAL INSERT FIXED BEARING POSTERIOR STABILIZED 8 8MM AOX
Type: IMPLANTABLE DEVICE | Site: KNEE | Status: FUNCTIONAL
Brand: ATTUNE

## 2024-06-24 DEVICE — ATTUNE KNEE SYSTEM FEMORAL POSTERIOR STABILIZED SIZE 8 LEFT CEMENTED
Type: IMPLANTABLE DEVICE | Site: KNEE | Status: FUNCTIONAL
Brand: ATTUNE

## 2024-06-24 RX ORDER — MELOXICAM 15 MG/1
15 TABLET ORAL DAILY
Qty: 30 TABLET | Refills: 0 | Status: SHIPPED | OUTPATIENT
Start: 2024-06-24 | End: 2024-07-24

## 2024-06-24 RX ORDER — DOCUSATE SODIUM 100 MG/1
100 CAPSULE, LIQUID FILLED ORAL 2 TIMES DAILY
Status: DISCONTINUED | OUTPATIENT
Start: 2024-06-24 | End: 2024-06-24 | Stop reason: HOSPADM

## 2024-06-24 RX ORDER — HYDRALAZINE HYDROCHLORIDE 20 MG/ML
5 INJECTION INTRAMUSCULAR; INTRAVENOUS EVERY 30 MIN PRN
Status: DISCONTINUED | OUTPATIENT
Start: 2024-06-24 | End: 2024-06-24 | Stop reason: HOSPADM

## 2024-06-24 RX ORDER — BISACODYL 5 MG
10 TABLET, DELAYED RELEASE (ENTERIC COATED) ORAL DAILY PRN
Status: DISCONTINUED | OUTPATIENT
Start: 2024-06-24 | End: 2024-06-24 | Stop reason: HOSPADM

## 2024-06-24 RX ORDER — TRANEXAMIC ACID 100 MG/ML
INJECTION, SOLUTION INTRAVENOUS AS NEEDED
Status: DISCONTINUED | OUTPATIENT
Start: 2024-06-24 | End: 2024-06-24

## 2024-06-24 RX ORDER — TRAMADOL HYDROCHLORIDE 50 MG/1
50 TABLET ORAL EVERY 6 HOURS PRN
Qty: 28 TABLET | Refills: 0 | Status: SHIPPED | OUTPATIENT
Start: 2024-06-24 | End: 2024-07-01

## 2024-06-24 RX ORDER — IPRATROPIUM BROMIDE 0.5 MG/2.5ML
500 SOLUTION RESPIRATORY (INHALATION) ONCE
Status: DISCONTINUED | OUTPATIENT
Start: 2024-06-24 | End: 2024-06-24 | Stop reason: HOSPADM

## 2024-06-24 RX ORDER — FAMOTIDINE 20 MG/1
20 TABLET, FILM COATED ORAL ONCE
Status: COMPLETED | OUTPATIENT
Start: 2024-06-24 | End: 2024-06-24

## 2024-06-24 RX ORDER — MEPERIDINE HYDROCHLORIDE 25 MG/ML
12.5 INJECTION INTRAMUSCULAR; INTRAVENOUS; SUBCUTANEOUS EVERY 10 MIN PRN
Status: DISCONTINUED | OUTPATIENT
Start: 2024-06-24 | End: 2024-06-24 | Stop reason: HOSPADM

## 2024-06-24 RX ORDER — ALBUTEROL SULFATE 0.83 MG/ML
2.5 SOLUTION RESPIRATORY (INHALATION) ONCE AS NEEDED
Status: DISCONTINUED | OUTPATIENT
Start: 2024-06-24 | End: 2024-06-24 | Stop reason: HOSPADM

## 2024-06-24 RX ORDER — OXYCODONE HYDROCHLORIDE 5 MG/1
5 TABLET ORAL EVERY 4 HOURS PRN
Status: DISCONTINUED | OUTPATIENT
Start: 2024-06-24 | End: 2024-06-24 | Stop reason: HOSPADM

## 2024-06-24 RX ORDER — POLYETHYLENE GLYCOL 3350 17 G/17G
17 POWDER, FOR SOLUTION ORAL DAILY
Qty: 238 G | Refills: 0 | Status: SHIPPED | OUTPATIENT
Start: 2024-06-24

## 2024-06-24 RX ORDER — METOCLOPRAMIDE 10 MG/1
10 TABLET ORAL ONCE
Status: COMPLETED | OUTPATIENT
Start: 2024-06-24 | End: 2024-06-24

## 2024-06-24 RX ORDER — FENTANYL CITRATE 50 UG/ML
50 INJECTION, SOLUTION INTRAMUSCULAR; INTRAVENOUS ONCE
Status: COMPLETED | OUTPATIENT
Start: 2024-06-24 | End: 2024-06-24

## 2024-06-24 RX ORDER — METOCLOPRAMIDE 10 MG/1
10 TABLET ORAL EVERY 6 HOURS PRN
Status: DISCONTINUED | OUTPATIENT
Start: 2024-06-24 | End: 2024-06-24 | Stop reason: HOSPADM

## 2024-06-24 RX ORDER — LIDOCAINE HYDROCHLORIDE 10 MG/ML
INJECTION, SOLUTION EPIDURAL; INFILTRATION; INTRACAUDAL; PERINEURAL AS NEEDED
Status: DISCONTINUED | OUTPATIENT
Start: 2024-06-24 | End: 2024-06-24

## 2024-06-24 RX ORDER — CEFAZOLIN SODIUM 2 G/100ML
2 INJECTION, SOLUTION INTRAVENOUS ONCE
Status: COMPLETED | OUTPATIENT
Start: 2024-06-24 | End: 2024-06-24

## 2024-06-24 RX ORDER — CYCLOBENZAPRINE HCL 10 MG
5 TABLET ORAL 3 TIMES DAILY PRN
Status: DISCONTINUED | OUTPATIENT
Start: 2024-06-24 | End: 2024-06-24 | Stop reason: HOSPADM

## 2024-06-24 RX ORDER — OXYCODONE HYDROCHLORIDE 5 MG/1
5 TABLET ORAL EVERY 6 HOURS PRN
Qty: 28 TABLET | Refills: 0 | Status: SHIPPED | OUTPATIENT
Start: 2024-06-24 | End: 2024-07-01

## 2024-06-24 RX ORDER — PANTOPRAZOLE SODIUM 40 MG/1
40 TABLET, DELAYED RELEASE ORAL
Status: DISCONTINUED | OUTPATIENT
Start: 2024-06-25 | End: 2024-06-24 | Stop reason: HOSPADM

## 2024-06-24 RX ORDER — OXYCODONE HYDROCHLORIDE 5 MG/1
10 TABLET ORAL EVERY 4 HOURS PRN
Status: DISCONTINUED | OUTPATIENT
Start: 2024-06-24 | End: 2024-06-24 | Stop reason: HOSPADM

## 2024-06-24 RX ORDER — METOCLOPRAMIDE HYDROCHLORIDE 5 MG/ML
10 INJECTION INTRAMUSCULAR; INTRAVENOUS EVERY 6 HOURS PRN
Status: DISCONTINUED | OUTPATIENT
Start: 2024-06-24 | End: 2024-06-24 | Stop reason: HOSPADM

## 2024-06-24 RX ORDER — PROPOFOL 10 MG/ML
INJECTION, EMULSION INTRAVENOUS AS NEEDED
Status: DISCONTINUED | OUTPATIENT
Start: 2024-06-24 | End: 2024-06-24

## 2024-06-24 RX ORDER — BISACODYL 10 MG/1
10 SUPPOSITORY RECTAL DAILY PRN
Status: DISCONTINUED | OUTPATIENT
Start: 2024-06-24 | End: 2024-06-24 | Stop reason: HOSPADM

## 2024-06-24 RX ORDER — PREGABALIN 75 MG/1
75 CAPSULE ORAL ONCE
Status: COMPLETED | OUTPATIENT
Start: 2024-06-24 | End: 2024-06-24

## 2024-06-24 RX ORDER — OXYCODONE HCL 10 MG/1
10 TABLET, FILM COATED, EXTENDED RELEASE ORAL ONCE
Status: COMPLETED | OUTPATIENT
Start: 2024-06-24 | End: 2024-06-24

## 2024-06-24 RX ORDER — CEFAZOLIN SODIUM 2 G/100ML
2 INJECTION, SOLUTION INTRAVENOUS EVERY 8 HOURS
Status: DISCONTINUED | OUTPATIENT
Start: 2024-06-24 | End: 2024-06-24 | Stop reason: HOSPADM

## 2024-06-24 RX ORDER — ACETAMINOPHEN 325 MG/1
650 TABLET ORAL EVERY 6 HOURS PRN
Status: DISCONTINUED | OUTPATIENT
Start: 2024-06-24 | End: 2024-06-24 | Stop reason: HOSPADM

## 2024-06-24 RX ORDER — HYDROMORPHONE HYDROCHLORIDE 1 MG/ML
1 INJECTION, SOLUTION INTRAMUSCULAR; INTRAVENOUS; SUBCUTANEOUS EVERY 2 HOUR PRN
Status: DISCONTINUED | OUTPATIENT
Start: 2024-06-24 | End: 2024-06-24 | Stop reason: HOSPADM

## 2024-06-24 RX ORDER — ONDANSETRON HYDROCHLORIDE 2 MG/ML
4 INJECTION, SOLUTION INTRAVENOUS ONCE AS NEEDED
Status: DISCONTINUED | OUTPATIENT
Start: 2024-06-24 | End: 2024-06-24 | Stop reason: HOSPADM

## 2024-06-24 RX ORDER — FENTANYL CITRATE 50 UG/ML
25 INJECTION, SOLUTION INTRAMUSCULAR; INTRAVENOUS EVERY 5 MIN PRN
Status: DISCONTINUED | OUTPATIENT
Start: 2024-06-24 | End: 2024-06-24 | Stop reason: HOSPADM

## 2024-06-24 RX ORDER — ASPIRIN 81 MG/1
81 TABLET ORAL 2 TIMES DAILY
Status: DISCONTINUED | OUTPATIENT
Start: 2024-06-25 | End: 2024-06-24 | Stop reason: HOSPADM

## 2024-06-24 RX ORDER — HYDROMORPHONE HYDROCHLORIDE 0.2 MG/ML
0.2 INJECTION INTRAMUSCULAR; INTRAVENOUS; SUBCUTANEOUS EVERY 5 MIN PRN
Status: DISCONTINUED | OUTPATIENT
Start: 2024-06-24 | End: 2024-06-24 | Stop reason: HOSPADM

## 2024-06-24 RX ORDER — ONDANSETRON 4 MG/1
4 TABLET, ORALLY DISINTEGRATING ORAL EVERY 8 HOURS PRN
Status: DISCONTINUED | OUTPATIENT
Start: 2024-06-24 | End: 2024-06-24 | Stop reason: HOSPADM

## 2024-06-24 RX ORDER — ASPIRIN 81 MG/1
81 TABLET ORAL 2 TIMES DAILY
Qty: 60 TABLET | Refills: 0 | Status: SHIPPED | OUTPATIENT
Start: 2024-06-24 | End: 2024-07-24

## 2024-06-24 RX ORDER — SODIUM CHLORIDE, SODIUM LACTATE, POTASSIUM CHLORIDE, CALCIUM CHLORIDE 600; 310; 30; 20 MG/100ML; MG/100ML; MG/100ML; MG/100ML
75 INJECTION, SOLUTION INTRAVENOUS CONTINUOUS
Status: DISCONTINUED | OUTPATIENT
Start: 2024-06-24 | End: 2024-06-24 | Stop reason: HOSPADM

## 2024-06-24 RX ORDER — BUPIVACAINE HYDROCHLORIDE 7.5 MG/ML
INJECTION, SOLUTION INTRASPINAL AS NEEDED
Status: DISCONTINUED | OUTPATIENT
Start: 2024-06-24 | End: 2024-06-24

## 2024-06-24 RX ORDER — SCOLOPAMINE TRANSDERMAL SYSTEM 1 MG/1
1 PATCH, EXTENDED RELEASE TRANSDERMAL
Status: DISCONTINUED | OUTPATIENT
Start: 2024-06-24 | End: 2024-06-24 | Stop reason: HOSPADM

## 2024-06-24 RX ORDER — DIPHENHYDRAMINE HYDROCHLORIDE 50 MG/ML
12.5 INJECTION INTRAMUSCULAR; INTRAVENOUS ONCE AS NEEDED
Status: DISCONTINUED | OUTPATIENT
Start: 2024-06-24 | End: 2024-06-24 | Stop reason: HOSPADM

## 2024-06-24 RX ORDER — NALOXONE HYDROCHLORIDE 0.4 MG/ML
0.2 INJECTION, SOLUTION INTRAMUSCULAR; INTRAVENOUS; SUBCUTANEOUS EVERY 5 MIN PRN
Status: DISCONTINUED | OUTPATIENT
Start: 2024-06-24 | End: 2024-06-24 | Stop reason: HOSPADM

## 2024-06-24 RX ORDER — KETOROLAC TROMETHAMINE 15 MG/ML
15 INJECTION, SOLUTION INTRAMUSCULAR; INTRAVENOUS EVERY 6 HOURS
Status: DISCONTINUED | OUTPATIENT
Start: 2024-06-24 | End: 2024-06-24 | Stop reason: HOSPADM

## 2024-06-24 RX ORDER — ACETAMINOPHEN 325 MG/1
975 TABLET ORAL ONCE
Status: COMPLETED | OUTPATIENT
Start: 2024-06-24 | End: 2024-06-24

## 2024-06-24 RX ORDER — PANTOPRAZOLE SODIUM 40 MG/1
40 TABLET, DELAYED RELEASE ORAL DAILY
Qty: 30 TABLET | Refills: 0 | Status: SHIPPED | OUTPATIENT
Start: 2024-06-24 | End: 2024-07-24

## 2024-06-24 RX ORDER — ONDANSETRON HYDROCHLORIDE 2 MG/ML
4 INJECTION, SOLUTION INTRAVENOUS EVERY 8 HOURS PRN
Status: DISCONTINUED | OUTPATIENT
Start: 2024-06-24 | End: 2024-06-24 | Stop reason: HOSPADM

## 2024-06-24 RX ORDER — PROPOFOL 10 MG/ML
INJECTION, EMULSION INTRAVENOUS CONTINUOUS PRN
Status: DISCONTINUED | OUTPATIENT
Start: 2024-06-24 | End: 2024-06-24

## 2024-06-24 RX ORDER — ROPIVACAINE/EPI/CLONIDINE/KET 2.46-0.005
SYRINGE (ML) INJECTION AS NEEDED
Status: DISCONTINUED | OUTPATIENT
Start: 2024-06-24 | End: 2024-06-24 | Stop reason: HOSPADM

## 2024-06-24 RX ORDER — DIPHENHYDRAMINE HYDROCHLORIDE 50 MG/ML
12.5 INJECTION INTRAMUSCULAR; INTRAVENOUS EVERY 6 HOURS PRN
Status: DISCONTINUED | OUTPATIENT
Start: 2024-06-24 | End: 2024-06-24 | Stop reason: HOSPADM

## 2024-06-24 RX ORDER — DOCUSATE SODIUM 100 MG/1
100 CAPSULE, LIQUID FILLED ORAL 2 TIMES DAILY
Qty: 60 CAPSULE | Refills: 0 | Status: SHIPPED | OUTPATIENT
Start: 2024-06-24 | End: 2024-07-24

## 2024-06-24 RX ORDER — MIDAZOLAM HYDROCHLORIDE 1 MG/ML
2 INJECTION, SOLUTION INTRAMUSCULAR; INTRAVENOUS ONCE
Status: COMPLETED | OUTPATIENT
Start: 2024-06-24 | End: 2024-06-24

## 2024-06-24 RX ORDER — TAMSULOSIN HYDROCHLORIDE 0.4 MG/1
0.4 CAPSULE ORAL DAILY
Status: DISCONTINUED | OUTPATIENT
Start: 2024-06-24 | End: 2024-06-24 | Stop reason: HOSPADM

## 2024-06-24 RX ORDER — ACETAMINOPHEN 500 MG
1000 TABLET ORAL EVERY 6 HOURS PRN
Qty: 240 TABLET | Refills: 0 | Status: SHIPPED | OUTPATIENT
Start: 2024-06-24 | End: 2024-07-24

## 2024-06-24 RX ORDER — SODIUM CHLORIDE, SODIUM LACTATE, POTASSIUM CHLORIDE, CALCIUM CHLORIDE 600; 310; 30; 20 MG/100ML; MG/100ML; MG/100ML; MG/100ML
50 INJECTION, SOLUTION INTRAVENOUS CONTINUOUS
Status: DISCONTINUED | OUTPATIENT
Start: 2024-06-24 | End: 2024-06-24 | Stop reason: HOSPADM

## 2024-06-24 RX ORDER — MELOXICAM 7.5 MG/1
7.5 TABLET ORAL ONCE
Status: COMPLETED | OUTPATIENT
Start: 2024-06-24 | End: 2024-06-24

## 2024-06-24 RX ORDER — POLYETHYLENE GLYCOL 3350 17 G/17G
17 POWDER, FOR SOLUTION ORAL DAILY
Status: DISCONTINUED | OUTPATIENT
Start: 2024-06-24 | End: 2024-06-24 | Stop reason: HOSPADM

## 2024-06-24 SDOH — HEALTH STABILITY: MENTAL HEALTH: CURRENT SMOKER: 0

## 2024-06-24 ASSESSMENT — PAIN DESCRIPTION - DESCRIPTORS
DESCRIPTORS: ACHING
DESCRIPTORS: ACHING

## 2024-06-24 ASSESSMENT — PAIN SCALES - GENERAL
PAINLEVEL_OUTOF10: 0 - NO PAIN
PAINLEVEL_OUTOF10: 2
PAIN_LEVEL: 0
PAINLEVEL_OUTOF10: 0 - NO PAIN
PAINLEVEL_OUTOF10: 1
PAINLEVEL_OUTOF10: 0 - NO PAIN

## 2024-06-24 ASSESSMENT — PAIN - FUNCTIONAL ASSESSMENT
PAIN_FUNCTIONAL_ASSESSMENT: 0-10

## 2024-06-24 ASSESSMENT — COLUMBIA-SUICIDE SEVERITY RATING SCALE - C-SSRS
6. HAVE YOU EVER DONE ANYTHING, STARTED TO DO ANYTHING, OR PREPARED TO DO ANYTHING TO END YOUR LIFE?: NO
2. HAVE YOU ACTUALLY HAD ANY THOUGHTS OF KILLING YOURSELF?: NO
1. IN THE PAST MONTH, HAVE YOU WISHED YOU WERE DEAD OR WISHED YOU COULD GO TO SLEEP AND NOT WAKE UP?: NO

## 2024-06-24 ASSESSMENT — COGNITIVE AND FUNCTIONAL STATUS - GENERAL
MOBILITY SCORE: 23
CLIMB 3 TO 5 STEPS WITH RAILING: A LITTLE

## 2024-06-24 ASSESSMENT — ACTIVITIES OF DAILY LIVING (ADL)
ADL_ASSISTANCE: INDEPENDENT
ADLS_ADDRESSED: YES

## 2024-06-24 NOTE — OP NOTE
Knee Replacement Total Cement Unilat ( DePuy Attune Knee, Pineapple [place] ) *Rapid recovery* (L) Operative Note     Date: 2024  OR Location: [facility]    Name: Toby Olea : 1960, Age: 63 y.o., MRN: [id number], Sex: male    Diagnosis  Pre-op Diagnosis     * Unilateral primary osteoarthritis, left knee [M17.12] Post-op Diagnosis     * Unilateral primary osteoarthritis, left knee [M17.12]     Procedures  Knee Replacement Total Cement Unilat ( DePuy Attune Knee, Pine[place] ) *Rapid recovery*  69290 - RI ARTHRP KNE CONDYLE&PLATU MEDIAL&LAT COMPARTMENTS      Surgeons      * Christian Loving - Primary    Resident/Fellow/Other Assistant:  Surgeons and Role:     * Alka Quevedo PA-C - Assisting    Procedure Summary  Anesthesia: Consult  ASA: III  Anesthesia Staff: Anesthesiologist: David Painting MD  CRNA: ANA Cabral-CRNA  Estimated Blood Loss: 25mL  Intra-op Medications:   Administrations occurring from 0700 to 0925 on 24:   Medication Name Total Dose   ropivacaine-epinephrine-clonidine-ketorolac 2.46-0.005- 0.0008-0.3mg/mL periarticular syringe 50 mL   oxygen (O2) therapy 20 L   ceFAZolin in dextrose (iso-os) (Ancef) IVPB 2 g 2 g              Anesthesia Record               Intraprocedure I/O Totals          Intake    Tranexamic Acid 0.00 mL    The total shown is the total volume documented since Anesthesia Start was filed.    Propofol Drip 0.00 mL    The total shown is the total volume documented since Anesthesia Start was filed.    Total Intake 0 mL          Specimen: No specimens collected     Staff:   Circulator: Mary Randolphub Person: Denisa  Scrub Person: Shayy Randolphub Person: Liliane         Drains and/or Catheters:   Closed/Suction Drain Left;Anterior Knee Accordion 15 Fr. (Active)       Tourniquet Times:     Total Tourniquet Time Documented:  Leg (Left) - 86 minutes  Total: Leg (Left) - 86 minutes      Implants:  Implants       Type Name Action Serial No.      Joint Knee BONE  CEMENT, SMART SET, HIGH VISCOSITY, 40GM - [id number] Implanted      Joint Knee BONE CEMENT, SMART SET, HIGH VISCOSITY, 40GM - [id number] Implanted      Joint Knee DOME, PATELLA, MEDIALIZED, 41MM - [id number] Implanted      Joint Knee INSERT, ATTUNE PS FB, SZ 8, 8MM - [id number] Implanted      Joint Knee FEMORAL, ATTUNE PS, MAGGY, SZ 8, LT - [id number] Implanted      Joint Knee TIBAL BASE ATTUNE FB, SZ 7 MAGGY - [id number] Implanted               Findings: severe OA    Indications: Toby Olea is an 63 y.o. male who is having surgery for Unilateral primary osteoarthritis, left knee [M17.12].     The patient was seen in the preoperative area. The risks, benefits, complications, treatment options, non-operative alternatives, expected recovery and outcomes were discussed with the patient. The possibilities of reaction to medication, pulmonary aspiration, injury to surrounding structures, bleeding, recurrent infection, the need for additional procedures, failure to diagnose a condition, and creating a complication requiring transfusion or operation were discussed with the patient. The patient concurred with the proposed plan, giving informed consent.  The site of surgery was properly noted/marked if necessary per policy. The patient has been actively warmed in preoperative area. Preoperative antibiotics have been ordered and given within 1 hours of incision. Venous thrombosis prophylaxis have been ordered including bilateral sequential compression devices    Procedure Details: L TKA  Complications:  None; patient tolerated the procedure well.    Disposition: PACU - hemodynamically stable.  Condition: stable     PREOPERATIVE DIAGNOSIS:  left knee osteoarthritis     POSTOPERATIVE DIAGNOSIS:  left knee osteoarthritis     OPERATION/PROCEDURE:  left total knee arthroplasty     SURGEON:  Christian Loving MD     ASSISTANT(S):  Alka Quevedo    The patient's surgery was assisted by KENN Ovalle, due to lack of availability of  a qualified resident to assist with the surgery.  Alka Quevedo was present for the essential parts of the procedure.  She acted as first assistant and assisted with preparing the leg, draping the leg, exposing the knee, retracting and manipulating the leg during surgery, facilitating safe performance of the procedure, and closure of the wound.     ANESTHESIA:  spinal     LOCATION:  [facility]     ESTIMATED BLOOD LOSS AND INTRAVENOUS FLUIDS:  Please see Anesthesia record.     COMPONENTS USED:  DePuy Attune knee system  1. 8 femur  2. 7 tibia  3. 41 patella  4. 8mm insert     BRIEF CLINICAL NOTE:  The patient is a 64 yo male with advanced osteoarthritis of  their left knee.  They failed conservative treatment and wished to  proceed with total knee arthroplasty which is indicated at this time.  We discussed the risks, benefits, and alternatives of surgery  including but not limited to infection, damage to vessel or nerve,  bleeding, soft tissue pain, DVT, PE, problems with anesthesia, lack  of range of motion, continued soft tissue pain, need for further  surgery, etc.  Consent was obtained.  They were taken to the operating  room in order to undergo the procedure.    PRE-OP ROM:      OPERATIVE REPORT:  The patient was transferred to the operating room table.  Time-out  was performed confirming patient name, medical record number,  surgical site, and adequate and appropriate imaging.  The patient  received appropriate IV antibiotics as well as tranexamic acid.  Once we were prepped and draped,midline skin incision was performed.  Hemostasis was obtained using electrocautery.  Underlying extensor mechanism was easily identified and entered using a standard medial parapatellar arthrotomy performedsharply.  The infrapatellar and suprapatellar fat pads were debrided.Initial medial release was performed.  The patella was subluxed laterally.  Distal femur was entered using a step drill.  Distal  femoral cut was performed, and  the femur was sized and prepared.  The tibia was subluxed forward using a double-prong PCL retractor.  The proximal tibia was cut in neutral mechanical axis using an  extramedullary tibial guide.  We then used the lamina  to clear out the posterior osteophytes and clear the meniscal remnants. We then sized the tibia and prepared it. We cut the patella freehand using a caliper to restore the native patellar height. We then trialed with multiple polyethylene inserts.  Once I was happy with the position of components and stability of the knee, all trial components were removed.  The  cut bony surfaces were thoroughly irrigated and dried.  We then cemented into place the real components.  The knee was held in extension until all cement was hardened.  All extraneous cement was  removed.  We then closed the extensor mechanism over a drain using interrupted #2 Ethibond as well as interrupted 0 Vicryl.  The subcu was closed with interrupted 2-0 Vicryl.  The skin was closed with  running 3-0 Biosyn followed by Dermabond and Steri-Strips.  Dry sterile dressing was placed.  The patient was transferred back to the hospital bed without incident or complication.  She will be  weightbearing as tolerated.  They will be on ASA and SCDs for DVT  prophylaxis.     Additional Details: WBAT, ASA    Attending Attestation: I was present and scrubbed for the key portions of the procedure.

## 2024-06-24 NOTE — PROGRESS NOTES
Physical Therapy Evaluation & Treatment    Patient Name: Toby Olea  MRN: [id number]  Today's Date: 6/24/2024   Time Calculation  Start Time: 1111  Stop Time: 1205  Time Calculation (min): 54 min    Assessment/Plan   PT Assessment  PT Assessment Results: Decreased strength, Decreased range of motion, Decreased endurance, Impaired balance, Decreased mobility, Orthopedic restrictions, Pain  Rehab Prognosis: Excellent  Evaluation/Treatment Tolerance: Patient tolerated treatment well  End of Session Communication: Bedside nurse  Assessment Comment: Pt presents with impaired functional mobility s/p L TKR. Recommend discharge home with intermittent assistance and home health PT. Pt was issued HEP handout. Pt verbalized and demonstrated understanding.   End of Session Patient Position: Up in chair, BLE elevated with support of extra chair due to height, ice to surgical site, call light in reach, needs met, spouse present, RN aware.  IP OR SWING BED PT PLAN  Inpatient or Swing Bed: Inpatient  PT Plan  Treatment/Interventions: Transfer training, Gait training, Stair training, Balance training, Strengthening, Endurance training, Range of motion, Therapeutic exercise, Therapeutic activity, Home exercise program, Positioning  PT Plan: Ongoing PT  PT Frequency: BID  PT Discharge Recommendations: Low intensity level of continued care  Equipment Recommended upon Discharge: Wheeled walker  PT Recommended Transfer Status: Stand by assist, Assistive device  PT - OK to Discharge: Yes      Subjective     General Visit Information:  General  Reason for Referral: L TKR  Referred By: Dr. Loving  Past Medical History Relevant to Rehab: R TKR, HLD, GERD, HTN, appendectomy, vasectomy, meniscectomy  Family/Caregiver Present: Yes (spouse)  Prior to Session Communication: Bedside nurse  Patient Position Received: Bed, 2 rail up  General Comment: L knee post-op dressing dry and intact. hemovac to L knee removed by PT, pressure held, clean  ABDs and ace wrap applied, long leg ace wrap reapplied, RN aware.    Home Living:  Home Living  Type of Home: House  Lives With: Spouse  Home Adaptive Equipment: Walker rolling or standard, Cane  Home Layout: Multi-level, Stairs to alternate level with rails, Bed/bath upstairs  Alternate Level Stairs-Rails: Left  Alternate Level Stairs-Number of Steps: 12  Home Access: Stairs to enter without rails  Entrance Stairs-Rails: None  Entrance Stairs-Number of Steps: 2  Prior Level of Function:  Prior Function Per Pt/Caregiver Report  Level of [place]: Independent with ADLs and functional transfers, Independent with homemaking with ambulation  ADL Assistance: Independent  Homemaking Assistance: Independent  Ambulatory Assistance: Independent  Vocational: Full time employment, Works at home  Leisure: working out  Prior Function Comments: Pt denies falls since R TKR in [place] 2024. Pt was still seeing outpatient PT for R TKR up until this surgery.  Precautions:  Precautions  LE Weight Bearing Status: Weight Bearing as Tolerated  Medical Precautions: Fall precautions  Post-Surgical Precautions: Left total knee precautions    Objective   Pain:  Pain Assessment  Pain Assessment: 0-10  0-10 (Numeric) Pain Score: 2  Pain Type: Surgical pain  Pain Location: Knee  Pain Orientation: Left  Pain Interventions: Cold applied, Repositioned, Ambulation/increased activity  Cognition:  Cognition  Overall Cognitive Status: Within Functional Limits  Attention: Within Functional Limits  Memory: Within Funtional Limits  Problem Solving: Within Functional Limits  Numeric Reasoning: Within Functional Limits  Abstract Reasoning: Within Functional Limits  Safety/Judgement: Within Functional Limits  Insight: Within function limits    General Assessments:  Activity Tolerance  Endurance: Endurance does not limit participation in activity    Sensation  Light Touch: No apparent deficits    Coordination  Movements are Fluid and Coordinated:  Yes    Postural Control  Postural Control: Within Functional Limits    Static Sitting Balance  Static Sitting-Balance Support: Feet supported  Static Sitting-Level of Assistance: Independent  Dynamic Sitting Balance  Dynamic Sitting-Balance Support: Feet supported  Dynamic Sitting-Comments: Independent    Static Standing Balance  Static Standing-Balance Support: Bilateral upper extremity supported (with RW)  Static Standing-Level of Assistance: Close supervision  Dynamic Standing Balance  Dynamic Standing-Balance Support: Bilateral upper extremity supported (with RW)  Dynamic Standing-Comments: Close supervision  Functional Assessments:  ADL  ADL's Addressed: Yes  UE Dressing Assistance: Independent  LE Dressing Assistance: Stand by  LE Dressing Deficit: Verbal cueing, Supervision/safety  Toileting Assistance with Device: Stand by  Toileting Deficit: Supervison/safety, Use of bedpan/urinal setup (urinal)    Bed Mobility  Bed Mobility: Yes  Bed Mobility 1  Bed Mobility 1: Supine to sitting  Level of Assistance 1: Independent    Transfers  Transfer: Yes  Transfer 1  Transfer From 1: Bed to, Wheelchair to  Transfer to 1: Wheelchair, Chair with arms  Technique 1: Sit to stand, Stand to sit  Transfer Device 1: Walker  Transfer Level of Assistance 1: Close supervision, Minimal verbal cues (cues for hand placement)    Ambulation/Gait Training  Ambulation/Gait Training Performed: Yes  Ambulation/Gait Training 1  Surface 1: Level tile  Device 1: Rolling walker  Assistance 1: Close supervision, Minimal verbal cues (cues for sequencing)  Quality of Gait 1:  (normal sam, reciprocal pattern, no LOB noted.)  Comments/Distance (ft) 1: 150 feet x 2    Stairs  Stairs: Yes  Stairs  Rails 1: Left  Device 1: Railing  Assistance 1: Close supervision, Moderate verbal cues (cues for sequencing)  Comment/Number of Steps 1: 3  Pt demonstrated step to pattern, decreased sam, no LOB noted.     Stairs 2  Rails 2: None (Comment)  Device  2: Single point cane  Assistance 2: Close supervision, Moderate verbal cues (cues for sequencing)  Comment/Number of Steps 2: 3  Pt demonstrated step to pattern, decreased sam, no LOB noted.     Extremity/Trunk Assessments:  RUE   RUE : Within Functional Limits  LUE   LUE: Within Functional Limits  RLE   RLE : Within Functional Limits  LLE   LLE : Exceptions to WFL,knee ROM/strength limited due to post-op pain and surgeon restrictions.     Treatments:  Therapeutic Exercise  Therapeutic Exercise Performed: Yes  B ankle pumps, L quad sets, L gluteal sets, L heel slides, L SAQ, L hip abduction, and L SLR x 10 reps each.    Outcome Measures:  [facility] Basic Mobility  Turning from your back to your side while in a flat bed without using bedrails: None  Moving from lying on your back to sitting on the side of a flat bed without using bedrails: None  Moving to and from bed to chair (including a wheelchair): None  Standing up from a chair using your arms (e.g. wheelchair or bedside chair): None  To walk in hospital room: None  Climbing 3-5 steps with railing: A little  Basic Mobility - Total Score: 23        Education Documentation  Handouts, taught by Beatrice Herring PT at 6/24/2024 11:11 AM.  Learner: Significant Other, Patient  Readiness: Acceptance  Method: Explanation, Demonstration, Handout  Response: Verbalizes Understanding, Demonstrated Understanding    Precautions, taught by Beatrice Herring PT at 6/24/2024 11:11 AM.  Learner: Significant Other, Patient  Readiness: Acceptance  Method: Explanation, Demonstration, Handout  Response: Verbalizes Understanding, Demonstrated Understanding    Body Mechanics, taught by Beatrice Herring PT at 6/24/2024 11:11 AM.  Learner: Significant Other, Patient  Readiness: Acceptance  Method: Explanation, Demonstration, Handout  Response: Verbalizes Understanding, Demonstrated Understanding    Home Exercise Program, taught by Beatrice Herring PT at 6/24/2024 11:11  AM.  Learner: Significant Other, Patient  Readiness: Acceptance  Method: Explanation, Demonstration, Handout  Response: Verbalizes Understanding, Demonstrated Understanding    Mobility Training, taught by Beatrice Herring PT at 6/24/2024 11:11 AM.  Learner: Significant Other, Patient  Readiness: Acceptance  Method: Explanation, Demonstration, Handout  Response: Verbalizes Understanding, Demonstrated Understanding    Education Comments  No comments found.    Beatrice Herring, MIKHAIL, DPT

## 2024-06-24 NOTE — PROGRESS NOTES
TCC met with patient at the bedside to discuss discharge plan.  63 yr old male admitted RR following left knee replacement with Dr. Loving   Plan is home with [facility] PT.  SOC confirmed for 6/25/24.  Spouse to transport home and assist with care as needed.   06/24/24 1029   Current Planned Discharge Disposition   Current Planned Discharge Disposition Home H  ([facility] PT)

## 2024-06-24 NOTE — PROGRESS NOTES
Met with Patient and Care Partner at bedside- Patient is s/p Right Total Knee Replacement with Dr. Christian Loving.  Discussion with patient included education on the following topics: TJR Education: Wound Care, Post-Op Activity, Post-Op Precautions, Cold-Therapy, Importance of post-op prescriptions, When to call the Surgeon's Office, Use of MyChart, and When to call 9-1-1.  Patient Did Not Complete - Patient had surgery within the last 12 months class prior to surgery.  Patient is able to verbalize understanding of class content/discussion.  Contact information was provided to patient for support and assistance during the post-operative period.

## 2024-06-24 NOTE — PROGRESS NOTES
Medication Education     Medication education for Toby Olea was provided to the patient and family for the following medication(s):  Aspirin  Docusate  Meloxicam  Oxycodone  Acetaminophen  Pantoprazole  Polyethylene Glycol  Tramadol      Medication education provided by a Pharmacist:  How to take and what to do if a dose is missed Proper dose, indication, possible ADRs How the medication works and benefits of taking it Benefits of taking the medication     Identified potential barriers to education:  None    Method(s) of Education:  Verbal    An opportunity to ask questions and receive answers was provided.     Assessment of understanding the patient and family:  2= meets goals/outcomes    Additional Notes (if applicable):     Hernandez Clarke, PharmD

## 2024-06-24 NOTE — DISCHARGE SUMMARY
MD Alka Perkins, MPAS, PAThiagoC, ATC  Adult Reconstruction and Joint Replacement Surgery  Phone: [phone]     Fax:[phone]             Discharge Summary    Discharge Diagnosis  Left Total Knee Arthroplasty     Issues Requiring Follow-Up  Home care services to start within 48 hours. Outpatient PT to start 2 weeks  S/P total Joint for Knees only. Hips optional.    Test Results Pending At Discharge  Pending Labs       No current pending labs.          Hospital Course  Patient underwent Left Total Knee Arthroplasty  on 6/24/24 without complications. The patient was then taken to the PACU in stable condition. Patient was then transferred to the or.  Pain was appropriately controlled. Diet was advanced as tolerated. Patient progressed adequately through their recovery during hospital stay including PT/ OT and were recommended for discharge. Patient was then discharged on  to home in stable condition. Patient had uneventful hospital course. Patient was instructed on the use of pain medications as needed for pain. The signs and symptoms of infection were discussed and the patient was given our number to call should they have any questions or concerns following discharge.    Based on my clinical judgment, the patient was provided with a 7-day prescription for opioid medication at 30 MED, indicated for treatment of acute pain in the setting of recent Total Joint Arthroplasty. OARRS report was run and has demonstrated an appropriate time course.  The patient has been provided with counseling pertaining to safe use of opioid medication.    Patient may use operative extremity WBAT with use of walker for assistance with ambulation .  Mepilex dressing to be removed POD # 7 by home care and incision left open to air  OAC for DVT prophylaxis started on POD #1 and to be taken for 30 days    Patient is to follow-up in 6 weeks at scheduled post-op visit.     Face-to Face after surgery progress  note  Pertinent Physical Exam At Time of Discharge  Review of Systems   Constitutional: Negative.  Negative for activity change, chills, fatigue and fever.   HENT: Negative.     Eyes: Negative.    Respiratory: Negative.  Negative for cough, chest tightness, shortness of breath and wheezing.    Cardiovascular: Negative.  Negative for palpitations.   Gastrointestinal:  Negative for abdominal pain, blood in stool, nausea and vomiting.   Endocrine: Negative.  Negative for cold intolerance and polyuria.   Genitourinary: Negative.  Negative for difficulty urinating, dysuria, frequency, hematuria and urgency.   Musculoskeletal:  Positive for gait problem and joint swelling. Negative for arthralgias and back pain.   Skin: Negative.  Negative for color change, pallor, rash and wound.   Allergic/Immunologic: Negative.  Negative for environmental allergies.   Neurological:  Negative for dizziness, weakness and light-headedness.   Hematological: Negative.    Psychiatric/Behavioral:  Negative for agitation, confusion and suicidal ideas. The patient is not nervous/anxious.    All other systems reviewed and are negative    Physical Exam  side: left knee  Vitals and nursing note reviewed. VSS, Afebrile  Constitutional:       Appearance: Normal appearance, awake and alert.  HENT:      Head: Normocephalic and atraumatic.       Pupils: Pupils are equal, round, and reactive to light.   Cardiovascular:      Rate and Rhythm: Normal rate and regular rhythm.   Pulmonary:      Effort: Pulmonary effort is normal.     Abdominal:         Palpations: Abdomen is soft.   Musculoskeletal:   Sensation intact bilaterally, sural/saph/sp/tibal n.  Motor intact flexion/extension/DF/PF/EHL/FHL bilaterally. Palpable symmetric DP/PT pulse bilaterally. Spinal wearing off.    Skin:      Bulky Dressing intact to the surgical extremity. No signs of gross bloody or purulent drainage.     General: Skin is warm and dry.      Capillary Refill: Capillary refill  takes less than 2 seconds.   Neurological:      General: No focal deficit present.      Mental Status: She is alert and oriented to person, place, and time. Mental status is at baseline.   Psychiatric:         Mood and Affect: Mood normal.        Home Medications  Scheduled medications    Current Facility-Administered Medications:     ceFAZolin in dextrose (iso-os) (Ancef) IVPB 2 g, 2 g, intravenous, Once, Alka Quevedo PA-C    famotidine (Pepcid) tablet 20 mg, 20 mg, oral, Once, David Painting MD    fentaNYL PF (Sublimaze) injection 50 mcg, 50 mcg, intravenous, Once, David Painting MD    lactated Ringer's infusion, 75 mL/hr, intravenous, Continuous, Alka Quevedo PA-C, Last Rate: 75 mL/hr at 06/24/24 0609, 75 mL/hr at 06/24/24 0609    metoclopramide (Reglan) tablet 10 mg, 10 mg, oral, Once, David Painting MD    midazolam (Versed) injection 2 mg, 2 mg, intravenous, Once, David Painting MD    scopolamine (Transderm-Scop) patch 1 patch, 1 patch, transdermal, q72h, Alka Quevedo PA-C, 1 patch at 06/24/24 0551     PRN medications      Discharge medications     Your medication list        START taking these medications        Instructions Last Dose Given Next Dose Due   acetaminophen 500 mg tablet  Commonly known as: Tylenol Extra Strength      Take 2 tablets (1,000 mg) by mouth every 6 hours if needed for mild pain (1 - 3).       aspirin 81 mg EC tablet      Take 1 tablet (81 mg) by mouth 2 times a day.       docusate sodium 100 mg capsule  Commonly known as: Colace      Take 1 capsule (100 mg) by mouth 2 times a day.       meloxicam 15 mg tablet  Commonly known as: Mobic      Take 1 tablet (15 mg) by mouth once daily.       oxyCODONE 5 mg immediate release tablet  Commonly known as: Roxicodone      Take 1 tablet (5 mg) by mouth every 6 hours if needed for severe pain (7 - 10) for up to 7 days.       polyethylene glycol 17 gram packet  Commonly known as: Glycolax, Miralax      Take 17 g by mouth once  daily. Mix 1 cap (17g) into 8 ounces of fluid.       traMADol 50 mg tablet  Commonly known as: Ultram      Take 1 tablet (50 mg) by mouth every 6 hours if needed for severe pain (7 - 10) for up to 7 days.              CONTINUE taking these medications        Instructions Last Dose Given Next Dose Due   CHOLECALCIFEROL (VITAMIN D3) ORAL           co Q10-red yeast rice  mg capsule           Klor-Con M20 20 mEq ER tablet  Generic drug: potassium chloride CR           losartan 100 mg tablet  Commonly known as: Cozaar           metFORMIN 500 mg tablet  Commonly known as: Glucophage           multivitamin tablet           pantoprazole 40 mg EC tablet  Commonly known as: ProtoNix      Take 1 tablet (40 mg) by mouth once daily. Do not crush, chew, or split.       SAW PALMETTO ORAL                  STOP taking these medications      chlorhexidine 0.12 % solution  Commonly known as: Peridex        chlorthalidone 50 mg tablet  Commonly known as: [place]                  Where to Get Your Medications        These medications were sent to [facility]  [address], [facility] [address]      Hours: 8 AM to 6 PM Mon-Fri, 9 AM to 1 PM Saturday Phone: [phone]   acetaminophen 500 mg tablet  aspirin 81 mg EC tablet  docusate sodium 100 mg capsule  meloxicam 15 mg tablet  oxyCODONE 5 mg immediate release tablet  pantoprazole 40 mg EC tablet  polyethylene glycol 17 gram packet  traMADol 50 mg tablet         You have not been prescribed any medications.     Outpatient Follow-Up  Patient to follow-up with /Alka Quevedo PA-C.  Thank you for trusting us with your care. You should be scheduled for a follow-up post-surgical visit in 6 weeks.    Special Instructions      Please read discharge instructions provided by your surgeon before calling with questions as this will delay care.    Medication refills-Oxycodone and Tramadol will be refilled every 7 days per state law. Request refills through  [facility] Navigator at the institution in which you had surgery or MyChart. All medication requests may take up to 72 hours to refill and refills after Friday 1pm will be refilled on the next business day.      No future appointments.    KAREY Cardona, PA-C ATC  Orthopedic Physician Assisant  Adult Reconstruction and Total Joint Replacement  General Orthopedics  Department of Orthopaedic Surgery  [place] [address]  [employer] preferred

## 2024-06-24 NOTE — ANESTHESIA PROCEDURE NOTES
Peripheral Block    Patient location during procedure: pre-op  Start time: 6/24/2024 6:50 AM  End time: 6/24/2024 6:52 AM  Reason for block: at surgeon's request and post-op pain management  Staffing  Performed: attending   Authorized by: David Painting MD    Performed by: David Painting MD  Preanesthetic Checklist  Completed: patient identified, IV checked, site marked, risks and benefits discussed, surgical consent, monitors and equipment checked, pre-op evaluation and timeout performed   Timeout performed at: 6/24/2024 6:46 AM  Peripheral Block  Patient position: laying flat  Prep: ChloraPrep  Patient monitoring: heart rate, cardiac monitor and continuous pulse ox  Block type: adductor canal  Laterality: left  Injection technique: single-shot  Guidance: ultrasound guided  Needle  Needle gauge: 21 G  Needle length: 10 cm  Needle localization: ultrasound guidance  Test dose: negative  Assessment  Injection assessment: negative aspiration for heme, no paresthesia on injection, incremental injection and local visualized surrounding nerve on ultrasound  Paresthesia pain: none  Heart rate change: no  Slow fractionated injection: yes  Additional Notes  Ropivicaine 20 ml 0.5 % with 5 mg Decadron

## 2024-06-24 NOTE — ANESTHESIA PROCEDURE NOTES
Spinal Block    Patient location during procedure: OR  Start time: 6/24/2024 7:09 AM  End time: 6/24/2024 7:11 AM  Reason for block: primary anesthetic  Staffing  Performed: CRNA   Authorized by: David Painting MD    Performed by: JOSE ANTONIO Cabral    Preanesthetic Checklist  Completed: patient identified, IV checked, risks and benefits discussed, surgical consent, monitors and equipment checked, pre-op evaluation, timeout performed and sterile techniques followed  Block Timeout  RN/Licensed healthcare professional reads aloud to the Anesthesia provider and entire team: Patient identity, procedure with side and site, patient position, and as applicable the availability of implants/special equipment/special requirements.  Patient on coagulant treatment: no  Timeout performed at: 6/24/2024 7:03 AM  Spinal Block  Patient position: sitting  Prep: Betadine  Sterility prep: cap, gloves and mask  Sedation level: light sedation  Patient monitoring: blood pressure, continuous pulse oximetry and heart rate  Approach: midline  Vertebral space: L3-4  Injection technique: single-shot  Needle  Needle type: pencil-point   Needle gauge: 25 G  Free flowing CSF: yes    Assessment  Sensory level: T10 bilateral

## 2024-06-24 NOTE — DISCHARGE INSTRUCTIONS
MD Alka Perkins MPAS, SHE, ATC  Adult Reconstruction and Joint Replacement Surgery  Phone: [phone]     Fax: [phone]        DISCHARGE INSTRUCTIONS      PLEASE READ CAREFULLY BEFORE CONTACTING YOUR PROVIDER.    WE WORK COLLABORATIVELY AS A TEAM. CALLING MULTIPLE STAFF MEMBERS REGARDING THE SAME ISSUE WILL DELAY YOUR CARE.    [employer] IS THE PREFERRED COMMUNICATION FOR ALL TEAM MEMBERS.    POSTOPERATIVE INSTRUCTIONS: TOTAL HIP & TOTAL KNEE ARTHROPLASTY    JOINT CARE TEAM  Please use the information below to contact your care team following surgery.  If you are leaving a message or using the [employer] Chart portal, please include your full name, date of birth and date of surgery so that we can correctly identify you.  Your call will be returned within 1-2 business days, please do not leave multiple messages with other staff regarding a single issue while you are awaiting a return call.     Who to call Contact Information Matters needing handled   Alka Quevedo PA-C  Physician Assistant [facility] Portal   Medical questions/concerns       Jaelyn Hernandez-    [email or web address]           [phone]  opt. 2    [phone] fax  [phone]         Scheduling office Visits  Medical questions/concerns  Leave of Absence or other paperwork  Any concerns more than 6 weeks from surgery - an appointment will need to be made   Denise Singer MBA, BSN, RN-BC  Ortho Nurse Navigator [place]    Dee Guerrero RN, BSN  Ortho Nurse Navigator [place]        __________________________________    Jun Sahni RN, BSN  Ortho Coordinator [facility] ECHOLSN-BC  Ortho Nurse Navigator [facility]       [phone] Please call staff at the institution in which you had surgery for prescription refills        Prescription Refills  Nursing, medical question related questions or concerns within 6 weeks of surgery   Orders  for Outpatient Physical Therapy             MEDICATION REFILLS - MyChart or Nurse Navigator (Above) at the institution in which you had surgery. [facility] or [facility].    -You will NOT receive a call indicating that your prescription has been filled.  Please contact your pharmacy with any questions.    Medication refills will be filled Monday-Friday 7am to 1pm ONLY. Please call the nurse navigator office or send a [employer] message for a refill request.  Any requests received outside of this timeframe will be handled on the next business day.  Please do not call multiple times or call other members of the care team for medication needs, this will cause the refill to take longer.    Per State and Institutional policy, pain medications can only be refilled every 7 days for up to six weeks following surgery.    My Chart Portal: If you are using the My Chart portal and are requesting a medication refill, please list what type of surgery you had and left or right side, medication that needs refilled, and pharmacy you would like your medication sent.     WEIGHT BEARING- weight bearing as tolerated to operative extremity     ACTIVITY-As Tolerated    DRIVING & TRAVEL AFTER SURGERY   Patients should anticipate waiting at least 4-6 weeks before traveling long distances after surgery.  You will need to stop to walk around ever 1 hour during your travel to help with blood clot prevention.    Patients may not drive until cleared by the joint nurse or the office and you are off of all narcotics.    DENTAL PROCEDURES & CLEANINGS  You must wait a minimum of 3 months for elective dental appointments after a total joint replacement, including routine cleanings or dental work including bridges, crowns, extractions, etc.. Unless, it is an emergency. You will need a prophylactic antibiotic lifelong prior to any dental visit, cleaning or procedure. Your surgeon's office or your dentist may provide a prescription antibiotic. Antibiotics are  a lifelong need before dental appointments.      You do not need antibiotics for endoscopic procedures such as colonoscopy or EGD, dermatologic biopsies or eye surgeries.    WOUND CARE  If you experience continued drainage or bleeding, you may cover with abdominal/Maxi pads (purchase at local drug store).  Knee replacements should wrap with an ace wrap.  You may shower with waterproof dressing on. Your surgical bandage will be removed by your home therapist 1 week after surgery. If you have staples intact, home care will remove in 2 weeks. If you have sutures intact, you will need to return to the office in 2 weeks for suture removal. Once the dressing is removed by home care, you may continue to shower. Let soap and water wash over the wound. DO NOT SCRUB.  Steri-strips under the bandage will remain in place until they fall off on their own.  If they are loose, you may gently remove.  If they have not fallen off in two weeks, gently peel them off. Do not remove if pulling causes resistance against the incision.  You will see suture tails sticking out of the ends of the incision.  DO NOT CUT THEM.  They will fall off when the sutures dissolve.  If they are bothersome, cover with a band aid.  Do not soak in a bath tub, hot tub, pool or lake until you are 8 weeks out from surgery.  Do not apply lotions, creams or ointments until you are 6 weeks out from surgery,    PAIN, SWELLING, BRUISING & CLICKING  Pain and swelling are a natural part of your recovery which is considered normal for up to a year after surgery.  Symptoms may be treated with movement, ice, compression stockings, elevating your leg, and by following the pain medication regimen as prescribed.  Bruising is normal for several weeks after surgery. You may also have leg swelling and pain in your shin.  You may ice areas that are tender to help with discomfort.  You are required to wear the provided compression stockings, every day, for 4 weeks following  surgery.  Remove the stockings at night and place them back on in the morning.  Pain and swelling may temporarily increase with an increase in activity or exercise.  Use ice after activity.  Audible clicking with movement or exercises is considered normal following joint replacement.  If this persists at 6 months or 1 year, please notify your surgeon.  You may also feel decreased sensation or numbness near the incision site.  This is normal and sensation may or may not return.    PERSONAL HYGIENE  You may shower upon discharging from the hospital.  Soap and water is permitted to run over the surgical dressing, steri-strips and incision.  Do not scrub directly over these items.  DO NOT soak your incision in a bath, hot tub, pool or pond/lake for a minimum of 8 weeks following your surgery.  DO NOT use lotions, creams, ointments on your wound for a minimum of 6 weeks following your surgery. At that time you may use vitamin E to assist with softening of your incision.      RESTARTING HOME ROUTINE - DIET & MEDICATIONS  Post-operative constipation can result due to a combination of inactivity, anesthesia and pain medication. To help prevent this, you should increase your water and fiber intake. Physical activity such as walking will also help stimulate the bowels.   You may resume your normal diet when you discharge home.    To avoid constipation, choose foods that help promote good bowel habits, such as foods high in fiber.  You may restart your home medications the following day after your surgery UNLESS you have been given alternate instructions.  Follow the instructions given to you on your hospital discharge instructions for more information regarding your home medications.  IF YOU EXPERIENCE NAUSEA OR DIARRHEA, FOLLOW THE B.R.A.T. DIET UNTIL SYMPTOMS RESOLVE.  If you are experiencing vomiting that lasts more than 24 hours, please contact your joint nurse.      IN-HOME PHYSICAL THERAPY & OUTPATIENT PHYSICAL  THERAPY  In-home physical therapy will start 1-2 days after you get home from the hospital.    The home care agency will call within the first 24-48 hours to set up their first visit.  Please do not call your care team to inquire during this timeframe.  Continue the exercises you were given in the hospital until you have been seen by in-home therapy.  Make sure to provide a phone number with the ability for the home care staff to leave a message if you do not answer your phone.    Outpatient physical therapy following knee replacement surgery should begin 2-3 weeks after surgery.  You will be given physical therapy order prior to discharge from the hospital. You should call to schedule this appointment ASAP if not already scheduled before surgery.  Waiting until you are ready for outpatient physical therapy will cause a delay in your care.  You may choose any outpatient physical therapy location.      EMERGENCIES - WHEN TO CONTACT THE SURGEON'S OFFICE IMMEDIATELY  Fever >101 with chills that has been present for at least 48 hours.   Excessive bleeding from incision that will not slow down. A small amount of drainage is normal and expected.  Once pressure is applied and the area is covered, do not continue to check the area regularly.  This will remove pressure and bleeding will continue.  Leave in place for 4-6 hours.  Signs of infection of incision-excessive drainage that is soaking through your dressing (especially if it is pus-like), redness that is spreading out from the edges of your incision, or increased warmth around the area.  Excruciating pain for which the pain medication, taken as instructed, is not helping.  Severe calf pain.  Go directly to the emergency room or call 911, if you are experiencing chest pain or difficulty breathing.    After Hour and Weekend Emergency Answering Service [phone]    ICE & COLD THERAPY INSTRUCTIONS    To assist with pain control and post-op swelling, you should be using  ice regularly throughout recovery, especially for the first 6 weeks, regardless of the cold therapy method you use.      Always make sure there is a layer of protection between the cold pad and your skin.    If you are using ICE PACKS or GEL PACKS, you will need to alternate 20 minutes on, 20 minutes off twice per hour.    If you are using an ICE MACHINE, please follow the provided ice machine instructions.  These devices differ from ice or ice packs whereas the mechanism circulates water through tubing and a pad to provide longer periods of cold therapy to the desired site.  You can use your cold devices around the clock for optimal comfort.  We recommend using cold therapy after working with therapy or completing exercises on your own.  There is no set schedule in which you must follow while using cold therapy.  Below are a few points to remember when using a cold therapy device:    You do not need to need to use the 20 on, 20 off method.  Detach the pad from the cooler and ambulate at least once every hour.  You can check your skin under the pad at this time.  You may wear the cold therapy device during periods of sleep including overnight.  If you wake up during the night, you can check the skin at this time.  You do not need to wake up specifically to perform skin checks.  Empty the cooler and pad when device is not in use.  Follow 's instructions for cleaning your cold therapy device.    DISCHARGE MEDICATIONS - Please reference the sample schedule on the reverse side for instructions on how to best schedule medications.    PAIN MEDICATION    ___X_ Tramadol / Oxycodone  Tramadol and Oxycodone have been prescribed for post-operative pain control.    These medications will only be refilled ONCE every 7 days for a period of up to 6 weeks following surgery.  After 6 weeks, you will transition to acetaminophen and over -the- counter anti-inflammatories such as Ibuprofen, Advil or Aleve in conjunction  with ICE/COLD THERAPY.   Side effects may be constipation and nausea, vomiting, sleepiness, dizziness, lightheadedness, headache, blurred vision, dry mouth sweating, itching (if you have itching, over-the -counter Benadryl can be used as needed).  You may NOT operate a motor vehicle while taking these medications or have been cleared by your care team.     ___X_ Acetaminophen (Tylenol)  Acetaminophen has been prescribed as an adjunct for pain control. Take two 500 mg tablets every 6 hours for 4 weeks. You will not receive a refill on this medication.  Do not exceed 4000mg of acetaminophen within a 24 hour period.  Side effects may include nausea, heartburn, drowsiness, and headache.    ___X_ Meloxicam (Mobic)-Meloxicam has been prescribed as an adjunct anti-inflammatory to assist in pain control.    Take one 15mg tablet once daily for 4 weeks.  You will not receive refills on this medication.   Side effects may include nausea.  May not be prescribed if you are on a more potent blood thinner than aspirin or have chronic kidney disease.    BLOOD THINNER    ___X_ Blood Thinner   A blood thinner has been prescribed to prevent blood clots in your leg or lungs. Take as prescribed on the bottle for 4 weeks. You will not receive a refill on this medication.    ANTI NAUSEA    ___X_ Proton Pump Inhibitor (PPI)-Stomach Acid Reduction Medication  If you are already on a PPI, you will continue your regular medication. If you are not, you will be prescribed Pantoprazole to help with nausea and protect your stomach while taking pain medication.  You will not receive a refill on this medication.    STOOL SOFTENERS    ___X_ Colace (Docusate Sodium) & Miralax (polyethylene glycol)  Take both medications to help with constipation while using the Oxycodone and Tramadol for pain control.  You will not receive a refill on this medication.    Continued Constipation  If you continue to be constipated despite daily use of Miralax and  Colace, you try an over-the-counter Dulcolax Suppository and use per instructions on the package.       SPECIAL INSTRUCTIONS   ***    You will not receive refills on the following medications.   Acetaminophen (Tylenol  Meloxicam  Miralax  Colace  Proton Pump Inhibitor (PPI)  Blood Thinner    Pain Medication Refills - Ortho Nurse Navigator or MyChart- Monday through Friday 7am-1pm    FOLLOW-UP- You should have an appointment with either Dr. Loving or KENN Ovalle in 6 weeks.         SAMPLE              The times below are an example of how to organize medications to optimize pain control  Your actual medication schedule may vary based on your last dose taken IN THE HOSPITAL      Time 3:00 am 6:00 am 9:00 am 12:00 pm 3:00 pm 6:00 pm 9:00 pm 12:00 am   Medications Tramadol Tylenol  Oxycodone  Miralax   Blood Thinner  Colace  Pantoprazole or other PPI  Tramadol  Meloxicam Tylenol  Oxycodone Tramadol Tylenol  Oxycodone  Miralax Blood Thinner  Colace  Tramadol   Tylenol  Oxycodone            You may begin to wean off the pain medication as your pain remains controlled with increased activity.  The schedules provided are meant to serve as an example.  You may wean off based on your pain control.  Please note that pain medications are not filled beyond 6 weeks after surgery.              The times below are an example of how to WEAN OFF medications WHILE CONTINUING TO OPTIMIZE PAIN CONTROL.  Your actual medication schedule may vary based on your last dose taken.    Time 12:00am 4:00am 8:00am 12:00pm 4:00pm 8:00pm   Med Tramadol Oxycodone   Tramadol Oxycodone Tramadol Oxycodone     Time 12:00am 6:00am 12:00pm 6:00pm   Med Tramadol Oxycodone   Tramadol Oxycodone     Time 12:00am 8:00am 4:00pm   Med Tramadol Oxycodone   Tramadol     Time 12:00am 12:00pm   Med Tramadol Tramadol         TOTAL KNEE REPLACEMENT PATIENTS SHOULD TRANSITION TO OUTPATIENT PHYSICAL THERAPY NO MORE THAN 3 WEEKS FOLLOWING SURGERY.  PLEASE SEE THE  LIST OF  FACILITIES BELOW.  CALL TO SCHEDULE YOUR FIRST APPOINTMENT BEFORE YOU HAVE YOUR SURGERY.

## 2024-06-24 NOTE — HH CARE COORDINATION
Home Care received a Referral for Physical Therapy. We have processed the referral for a Start of Care on 6/25/24.     If you have any questions or concerns, please feel free to contact us at [phone]. Follow the prompts, enter your five digit zip code, and you will be directed to your care team on [facility].

## 2024-06-24 NOTE — ADDENDUM NOTE
Addendum  created 06/24/24 1028 by ANA Cabral-WESLEY    Intraprocedure Meds edited, Orders acknowledged in Narrator

## 2024-06-24 NOTE — ANESTHESIA POSTPROCEDURE EVALUATION
Patient: Tboy Olea    Procedure Summary       Date: 06/24/24 Room / Location: [facility] / [facility]    Anesthesia Start: 0704 Anesthesia Stop: 0906    Procedure: Knee Replacement Total Cement Unilat ( DePuy Attune Knee, [place] ) *Rapid recovery* (Left: Knee) Diagnosis:       Unilateral primary osteoarthritis, left knee      (Unilateral primary osteoarthritis, left knee [M17.12])    Surgeons: Christian Loivng MD Responsible Provider: David Painting MD    Anesthesia Type: regional, spinal ASA Status: 3            Anesthesia Type: regional, spinal    Vitals Value Taken Time   /84 06/24/24 0926   Temp 36.1 °C (97 °F) 06/24/24 0926   Pulse 63 06/24/24 0926   Resp 17 06/24/24 0926   SpO2 96 % 06/24/24 0926       Anesthesia Post Evaluation    Patient location during evaluation: PACU  Patient participation: complete - patient participated  Level of consciousness: awake  Pain score: 0  Pain management: adequate  Multimodal analgesia pain management approach  Airway patency: patent  Two or more strategies used to mitigate risk of obstructive sleep apnea  Cardiovascular status: acceptable  Respiratory status: acceptable  Hydration status: acceptable  Postoperative Nausea and Vomiting: none        There were no known notable events for this encounter.

## 2024-06-24 NOTE — PERIOPERATIVE NURSING NOTE
Patient in Phase 1; back to baseline and tolerating po fluids, no complaint of pain and no complaint of nausea.     Discussed next steps and patient has no questions at this time.     Patient clinically appropriate for discharge from PACU phase I, report given and patient transported via cart.       
86.2

## 2024-06-25 ENCOUNTER — HOME CARE VISIT (OUTPATIENT)
Dept: HOME HEALTH SERVICES | Facility: HOME HEALTH | Age: 64
End: 2024-06-25
Payer: MEDICARE

## 2024-06-25 VITALS
SYSTOLIC BLOOD PRESSURE: 128 MMHG | HEART RATE: 76 BPM | RESPIRATION RATE: 16 BRPM | OXYGEN SATURATION: 94 % | TEMPERATURE: 98 F | DIASTOLIC BLOOD PRESSURE: 60 MMHG

## 2024-06-25 PROCEDURE — G0151 HHCP-SERV OF PT,EA 15 MIN: HCPCS

## 2024-06-25 PROCEDURE — 0023 HH SOC

## 2024-06-25 SDOH — HEALTH STABILITY: PHYSICAL HEALTH: EXERCISE COMMENTS: SUPINE QUAD SETS, SAQ, HEEL SLIDES

## 2024-06-25 ASSESSMENT — ACTIVITIES OF DAILY LIVING (ADL)
AMBULATION ASSISTANCE: STAND BY ASSIST
ENTERING_EXITING_HOME: NEEDS ASSISTANCE
OASIS_M1830: 05
AMBULATION_DISTANCE/DURATION_TOLERATED: 30
AMBULATION ASSISTANCE ON FLAT SURFACES: 1
AMBULATION ASSISTANCE: 1

## 2024-06-25 ASSESSMENT — ENCOUNTER SYMPTOMS
LIMITED RANGE OF MOTION: 1
LOWEST PAIN SEVERITY IN PAST 24 HOURS: 3/10
HIGHEST PAIN SEVERITY IN PAST 24 HOURS: 5/10
PAIN LOCATION: LEFT KNEE
PAIN: 1
OCCASIONAL FEELINGS OF UNSTEADINESS: 0
MUSCLE WEAKNESS: 1
PAIN SEVERITY GOAL: 1/10
PERSON REPORTING PAIN: PATIENT

## 2024-06-27 ENCOUNTER — HOME CARE VISIT (OUTPATIENT)
Dept: HOME HEALTH SERVICES | Facility: HOME HEALTH | Age: 64
End: 2024-06-27
Payer: MEDICARE

## 2024-06-27 VITALS
HEART RATE: 94 BPM | SYSTOLIC BLOOD PRESSURE: 130 MMHG | DIASTOLIC BLOOD PRESSURE: 70 MMHG | OXYGEN SATURATION: 91 % | TEMPERATURE: 98.3 F

## 2024-06-27 PROCEDURE — G0151 HHCP-SERV OF PT,EA 15 MIN: HCPCS

## 2024-06-27 SDOH — HEALTH STABILITY: PHYSICAL HEALTH
EXERCISE COMMENTS: SUPINE QUAD SETS, ANKLE PUMPS, HEEL SLIDES, SAQ, SLR  SITTING HIP FLEXION, LAQ, KNEE FLEXION  STANDING TOE RAIES, KNEE FLEXION, HIP FLEXION 10 REPS

## 2024-06-27 ASSESSMENT — ACTIVITIES OF DAILY LIVING (ADL)
AMBULATION ASSISTANCE ON FLAT SURFACES: 1
AMBULATION_DISTANCE/DURATION_TOLERATED: 50
AMBULATION ASSISTANCE: STAND BY ASSIST
AMBULATION ASSISTANCE: 1

## 2024-06-27 ASSESSMENT — ENCOUNTER SYMPTOMS
PAIN LOCATION: LEFT KNEE
LOWEST PAIN SEVERITY IN PAST 24 HOURS: 2/10
PERSON REPORTING PAIN: PATIENT
PAIN SEVERITY GOAL: 1/10
PAIN: 1
HIGHEST PAIN SEVERITY IN PAST 24 HOURS: 6/10
LIMITED RANGE OF MOTION: 1
SUBJECTIVE PAIN PROGRESSION: GRADUALLY IMPROVING
OCCASIONAL FEELINGS OF UNSTEADINESS: 0

## 2024-07-01 ENCOUNTER — HOME CARE VISIT (OUTPATIENT)
Dept: HOME HEALTH SERVICES | Facility: HOME HEALTH | Age: 64
End: 2024-07-01
Payer: MEDICARE

## 2024-07-01 VITALS
DIASTOLIC BLOOD PRESSURE: 70 MMHG | RESPIRATION RATE: 16 BRPM | TEMPERATURE: 97.8 F | HEART RATE: 96 BPM | SYSTOLIC BLOOD PRESSURE: 130 MMHG | OXYGEN SATURATION: 96 %

## 2024-07-01 PROCEDURE — G0151 HHCP-SERV OF PT,EA 15 MIN: HCPCS

## 2024-07-01 SDOH — HEALTH STABILITY: PHYSICAL HEALTH
EXERCISE COMMENTS: PATIENT REPORTS DID ALL EXERCISES PRIOR TO MY ARRIVAL. PATIENT REPORTS DOING SITTING, SUPINE AND STANDING EXERCISES.  PATIENT INSTRUCTED TO CONTINUE WITH HEP

## 2024-07-01 ASSESSMENT — ENCOUNTER SYMPTOMS
LIMITED RANGE OF MOTION: 1
PAIN LOCATION: LEFT KNEE
PAIN: 1
PERSON REPORTING PAIN: PATIENT
LOWEST PAIN SEVERITY IN PAST 24 HOURS: 1/10
HIGHEST PAIN SEVERITY IN PAST 24 HOURS: 2/10
PAIN SEVERITY GOAL: 1/10
MUSCLE WEAKNESS: 1

## 2024-07-01 ASSESSMENT — ACTIVITIES OF DAILY LIVING (ADL)
AMBULATION_DISTANCE/DURATION_TOLERATED: 100
AMBULATION ASSISTANCE ON FLAT SURFACES: 1
AMBULATION ASSISTANCE: SUPERVISION
AMBULATION ASSISTANCE: 1

## 2024-07-03 ENCOUNTER — HOME CARE VISIT (OUTPATIENT)
Dept: HOME HEALTH SERVICES | Facility: HOME HEALTH | Age: 64
End: 2024-07-03
Payer: MEDICARE

## 2024-07-03 PROCEDURE — G0151 HHCP-SERV OF PT,EA 15 MIN: HCPCS

## 2024-07-04 VITALS
HEART RATE: 90 BPM | DIASTOLIC BLOOD PRESSURE: 60 MMHG | RESPIRATION RATE: 16 BRPM | TEMPERATURE: 97.9 F | OXYGEN SATURATION: 93 % | SYSTOLIC BLOOD PRESSURE: 128 MMHG

## 2024-07-04 ASSESSMENT — ENCOUNTER SYMPTOMS
DENIES PAIN: 1
PERSON REPORTING PAIN: PATIENT

## 2024-07-04 ASSESSMENT — ACTIVITIES OF DAILY LIVING (ADL)
HOME_HEALTH_OASIS: 00
AMBULATION ASSISTANCE: 1
AMBULATION ASSISTANCE ON FLAT SURFACES: 1
OASIS_M1830: 00
AMBULATION ASSISTANCE: INDEPENDENT

## 2024-07-09 ENCOUNTER — EVALUATION (OUTPATIENT)
Dept: PHYSICAL THERAPY | Facility: CLINIC | Age: 64
End: 2024-07-09
Payer: MEDICARE

## 2024-07-09 DIAGNOSIS — Z96.652 S/P TOTAL KNEE ARTHROPLASTY, LEFT: ICD-10-CM

## 2024-07-09 DIAGNOSIS — Z74.09 IMPAIRED FUNCTIONAL MOBILITY, BALANCE, GAIT, AND ENDURANCE: Primary | ICD-10-CM

## 2024-07-09 PROCEDURE — 97110 THERAPEUTIC EXERCISES: CPT | Mod: GP

## 2024-07-09 PROCEDURE — 97161 PT EVAL LOW COMPLEX 20 MIN: CPT | Mod: GP

## 2024-07-09 ASSESSMENT — ENCOUNTER SYMPTOMS
DEPRESSION: 0
OCCASIONAL FEELINGS OF UNSTEADINESS: 1
LOSS OF SENSATION IN FEET: 0

## 2024-07-09 NOTE — PROGRESS NOTES
Physical Therapy Evaluation    Patient Name: Toby Olea  MRN: [id number]  Today's Date: 07/09/24        Insurance:  Visit number: 1 of 9  Insurance Type: Payor: [employer] / Plan: [employer] / Product Type: *No Product type* /   Authorization or Plan of Care date Range: 8/1/24    Therapy diagnoses:   1. Impaired functional mobility, balance, gait, and endurance        2. S/P total knee arthroplasty, left               Precautions:  Right TKA, HTN,   Fall Risk: Low    SUBJECTIVE:  Pt with left TKA. Pt with good pain control. Pt with no reports of drainage or issues with the incision. Pt walking indp with some limping. Pt states he is slightly swollen.   AM is difficult. Very sore in the AM.   Pt lives an active lifestyle. Wants to get  back to doing everything.   Pt had the right done in the spring. Has some insurance limitations. Pt is familiar with the program.     Pain:  Right 0/10  Left 2/10  Home Living:  Multi level home   Prior level of function:  INDP     OBJECTIVE:  Knee AROM: (degrees) Left Right   Flexion 117 124   Extension 10 3     Knee PROM: (degrees) Left Right   Flexion     Extension       Knee Strength: MMT Left Right   Flexion 4-/5 4+/5   Extension 4-/5 4+/5       Left quad lag 11  Hip AROM WFL LEILA   HIP MMT  Hip flex 4+/5 leila, abd 4+/5 right 4/5 left, add 5/5 leila, ext 4/5 leila, IR ER 4/5 leila   ANKLE AROM WFL leila   Ankle MMT DF PF 4+/5 leila     Swelling/Girth: left 47.1 KJL right 44.9  Positive Special Tests: negative homans leila   Gait: ambulation INDP with mild to moderate antalgia, lateral lean leila to off load LE, reduced swing limb advancement, WBOS   Palpation: incision is healing, scabbed, stitch distally  Patellar mobility: WFL   Flexibility:   Hamstrings: mod deficit    Quadriceps: mod deficit    Hip Flexors: mild to mod deficit   Stair ambulation mod indp of leila UE - recp leans anteriorly     ASSESSMENT:  Pt is s/p left TKA with right TKA in the spring. Pt with good  motivation and participation. Pt with good understanding of POC and HEP. Pt will need skilled PT to address functional deficits of ROM, strength, flexibility, gait and balance. Pt with gait deficits and reduced SLS. Pt with skilled need for improved marco of all WB activity with improved NMR of gait/balance. Pt with tendency to lean forward with all WB activity.   Pt presents with the following deficits/problems that indicate a skilled need for PT:   ROM, strength, balance, gait, functional mobility, flexibility   Level of Complexity: low     TREATMENT:  Bike level 2 5 min.   Stair stretches - hip flex, hamstring, gastroc 3 x 20 sec eze   Step taps 6 inch     2 x 10 eze:  LAQ  SAQ  SLR  Abduction  QS  Heel slides     PATIENT EDUCATION:  HEP  goals  safety  POC  interventions selected    Has HEP copy from RTKA a few weeks ago    PLAN:   Pt to be seen 2 times per week for 6-8 weeks.   Pt POC to include:  Therapeutic EX, Therapeutic ACT, NMR, Self care, Manual therapy, Gait training, CP/MHP, Education      Rehab potential:  Good   Plan of care agreement  Patient   GOALS:    1) Pain will be reduced to 0/10 at rest no more than 2/10 with activity.   2) Function will be increased to be able to complete all household tasks safely INDP, return to community activity safely INDP   3) ROM will be increased to be WNL at 0-120  4) Strength to be increased to be WNL at 5/5 eze LE   5) Independent in Home Exercise Program  6) Independent return to boating, biking, and walking in the community safely INDP   7) Outcome tool improvement to LEFS 65/80  8) flexibility WFL eze LE   9) ambulation INDP with good technique no antalgically unrestricted by LLE.   10) balance SLS 12 sec eze

## 2024-07-11 ENCOUNTER — TREATMENT (OUTPATIENT)
Dept: PHYSICAL THERAPY | Facility: CLINIC | Age: 64
End: 2024-07-11
Payer: MEDICARE

## 2024-07-11 DIAGNOSIS — Z96.651 S/P TKR (TOTAL KNEE REPLACEMENT) USING CEMENT, RIGHT: ICD-10-CM

## 2024-07-11 DIAGNOSIS — Z74.09 IMPAIRED FUNCTIONAL MOBILITY, BALANCE, GAIT, AND ENDURANCE: ICD-10-CM

## 2024-07-11 DIAGNOSIS — Z96.651 S/P TKR (TOTAL KNEE REPLACEMENT), RIGHT: ICD-10-CM

## 2024-07-11 PROCEDURE — 97110 THERAPEUTIC EXERCISES: CPT | Mod: GP

## 2024-07-11 NOTE — PROGRESS NOTES
Physical Therapy Treatment    Patient Name: Toby Olea  MRN: [id number]    Today's Date: 8/8/2024    Insurance:  Visit number: 2 of 9  Insurance Type: Payor: [facility] [employer] / Plan: [employer] / Product Type: *No Product type* /   Authorization or Plan of Care date Range: 8/1/24    Time Calculation  Start Time: 0730  Stop Time: 0815  Time Calculation (min): 45 min    Diagnosis:   1. S/P total knee arthroplasty, left        2. Impaired functional mobility, balance, gait, and endurance  Follow Up In Physical Therapy          PRECAUTIONS:  Right TKA     SUBJECTIVE:  Pt does not report pain. Has soreness and tightness in the AM.     OBJECTIVE:  AROM 3-119    TREATMENT:  - Therex:  Bike level 3 10 min.   Stair stretches - hip flex, hamstring, gastroc 3 x 20 sec eze     X 25:  Step up 6 inch   Step over 2inch   Air ex hip abduction, extension   Side step up 6 inch     Supine 3 x 10 eze:  SAQ 1#   SLR 1#  Heel slides  Bridges   S/l abduction x 20     Seated 3 x 10:  LAQ   Sit to stand elevated mat       - Manual Therapy:  AA ROM end range       - Gait Train:  INDP ambulation with mild antalgia     - Modalities:    CP x 10 min supine     ASSESSMENT:   Pt marco well with no reports of pain during exercise progression. Fatigued with progressive exercise. Pt with cues for technique for TKE. Pt also cued to reduce UE assist and reduce forward lean. Pt cued to reduce speed to work on technique and quality.     PLAN:    Progress POC with focus on technique       Billing:     PT Therapeutic Procedures Time Entry  Therapeutic Exercise Time Entry: 45

## 2024-07-16 ENCOUNTER — TREATMENT (OUTPATIENT)
Dept: PHYSICAL THERAPY | Facility: CLINIC | Age: 64
End: 2024-07-16
Payer: MEDICARE

## 2024-07-16 DIAGNOSIS — Z96.651 S/P TKR (TOTAL KNEE REPLACEMENT), RIGHT: ICD-10-CM

## 2024-07-16 DIAGNOSIS — Z74.09 IMPAIRED FUNCTIONAL MOBILITY, BALANCE, GAIT, AND ENDURANCE: ICD-10-CM

## 2024-07-16 DIAGNOSIS — Z96.651 S/P TKR (TOTAL KNEE REPLACEMENT) USING CEMENT, RIGHT: ICD-10-CM

## 2024-07-16 PROCEDURE — 97110 THERAPEUTIC EXERCISES: CPT | Mod: GP | Performed by: PHYSICAL THERAPIST

## 2024-07-16 NOTE — PROGRESS NOTES
Physical Therapy Treatment    Patient Name: Toby Olea  MRN: [id number]    Today's Date: 7/16/2024    Insurance:  Visit number: 3 of 9  Insurance Type: Payor: [employer] / Plan: [id number] / Product Type: *No Product type* /   Authorization or Plan of Care date Range: 8/1/24    Time Calculation  Start Time: 0745  Stop Time: 0845  Time Calculation (min): 60 min    Diagnosis:   1. S/P TKR (total knee replacement), right  Follow Up In Physical Therapy      2. S/P TKR (total knee replacement) using cement, right  Follow Up In Physical Therapy      3. Impaired functional mobility, balance, gait, and endurance  Follow Up In Physical Therapy          PRECAUTIONS:  Right TKA     SUBJECTIVE:  Pt reports that his knees get stiff late in the evening and early in the morning. When he moves around throughout the day, his symptoms improve. No rated pain this AM, just some stiffness. HEP going well.    OBJECTIVE:  AROM 0-125    TREATMENT:  - Therex:  Bike level 10, 12 min.   Stair stretches - hip flex, hamstring, gastroc 3 x 20 sec eze     X 25:  Step up 8 inch   Air ex hip abduction, extension   Side step up 8 inch     Supine 3 x 10 eze:  SAQ 2#   SLR 2#  Bridges   S/l abduction 2# x20     Seated 3 x 10:  LAQ 2#  RTB HS curl      - Manual Therapy:  AA ROM end range flex/ext    - Modalities:    CP x 10 min supine     ASSESSMENT:  Pt with great progress of (L) knee function. His ROM is near full and he exhibits progress with functional movement and strength of the LLE. Ongoing therapy will allow for return to prior level of function.    PLAN:   Add in total gym squatting and SL balance.      Billing:     PT Therapeutic Procedures Time Entry  Manual Therapy Time Entry: 5  Therapeutic Exercise Time Entry: 45  PT Modalities Time Entry  Hot/Cold Pack Time Entry: 10

## 2024-07-18 ENCOUNTER — APPOINTMENT (OUTPATIENT)
Dept: PHYSICAL THERAPY | Facility: CLINIC | Age: 64
End: 2024-07-18
Payer: MEDICARE

## 2024-07-23 ENCOUNTER — TREATMENT (OUTPATIENT)
Dept: PHYSICAL THERAPY | Facility: CLINIC | Age: 64
End: 2024-07-23
Payer: MEDICARE

## 2024-07-23 DIAGNOSIS — Z96.651 S/P TKR (TOTAL KNEE REPLACEMENT) USING CEMENT, RIGHT: ICD-10-CM

## 2024-07-23 DIAGNOSIS — Z74.09 IMPAIRED FUNCTIONAL MOBILITY, BALANCE, GAIT, AND ENDURANCE: Primary | ICD-10-CM

## 2024-07-23 DIAGNOSIS — Z96.651 S/P TKR (TOTAL KNEE REPLACEMENT), RIGHT: ICD-10-CM

## 2024-07-23 DIAGNOSIS — Z96.652 S/P TOTAL KNEE ARTHROPLASTY, LEFT: ICD-10-CM

## 2024-07-23 PROCEDURE — 97110 THERAPEUTIC EXERCISES: CPT | Mod: GP

## 2024-07-23 NOTE — PROGRESS NOTES
Physical Therapy Treatment    Patient Name: Toby Olea  MRN: [id number]    Today's Date: 8/8/2024    Insurance:  Visit number: 4 of 9  Insurance Type: Payor: [employer] / Plan: [employer] / Product Type: *No Product type* /   Authorization or Plan of Care date Range: 8/1/24    Time Calculation  Start Time: 0730  Stop Time: 0820  Time Calculation (min): 50 min    Diagnosis:   1. Impaired functional mobility, balance, gait, and endurance  Follow Up In Physical Therapy      2. S/P total knee arthroplasty, left            PRECAUTIONS:  Right TKA     SUBJECTIVE:  Has no pain in the left knee. Has pain he rates more as fatigue in the right knee. Is working it out during the day and it does not swell but gets tired.       OBJECTIVE:      TREATMENT:  - Therex:  Bike level 10, 12 min.   Stair stretches - hip flex, hamstring, gastroc 3 x 20 sec eze   TG stop at 7 - x 25 - technique     X 25:  Step up 8 inch   Air ex hip abduction, extension   Side step up 8 inch   4 inch ant tap down - heel strike emphasis   NBOS 30 sec x 3 eze   SLS 15 sec x 3 eze     Supine 3 x 10 eze:  SAQ 2#   SLR 2#  Bridges   S/l abduction 2# x20     Seated 3 x 10:  LAQ 2#  RTB HS curl      - Manual Therapy:  AA ROM end range flex/ext    - Modalities:    CP x 10 min supine     ASSESSMENT:  Pt marco well with cues for technique and reduced speed of exercise. Pt with quad soreness noted with emphasis on technique of exercise to not compensate with momentum. Pt progressing well. Pt able to adapt well to cues. Fatigue end of session.       PLAN:   Progress to goals.       Billing:     PT Therapeutic Procedures Time Entry  Therapeutic Exercise Time Entry: 50

## 2024-07-25 ENCOUNTER — TREATMENT (OUTPATIENT)
Dept: PHYSICAL THERAPY | Facility: CLINIC | Age: 64
End: 2024-07-25
Payer: MEDICARE

## 2024-07-25 DIAGNOSIS — Z74.09 IMPAIRED FUNCTIONAL MOBILITY, BALANCE, GAIT, AND ENDURANCE: Primary | ICD-10-CM

## 2024-07-25 DIAGNOSIS — Z96.652 S/P TOTAL KNEE ARTHROPLASTY, LEFT: ICD-10-CM

## 2024-07-25 PROCEDURE — 97110 THERAPEUTIC EXERCISES: CPT | Mod: GP

## 2024-07-25 NOTE — PROGRESS NOTES
Physical Therapy Treatment    Patient Name: Toby Olea  MRN: [id number]    Today's Date: 8/8/2024    Insurance:  Visit number: 5 of 9  Insurance Type: [facility]: [facility] [employer] / Plan: [employer] / Product Type: *No Product type* /   Authorization or Plan of Care date Range: 8/1/24    Time Calculation  Start Time: 0730  Stop Time: 0820  Time Calculation (min): 50 min    Diagnosis:   1. Impaired functional mobility, balance, gait, and endurance  Follow Up In Physical Therapy      2. S/P total knee arthroplasty, left  Follow Up In Physical Therapy          PRECAUTIONS:  Right TKA     SUBJECTIVE:  Pain 0/10 eze. Reports just soft tissue stuff - tight. Sore in the AM.     OBJECTIVE:  SLS sporadic UE assist  NBOS 30 sec eze INDP     TREATMENT:  - Therex:  Bike level 10, 10 min.   Stair stretches - hip flex, hamstring, gastroc 3 x 20 sec eze   TG stop at 7 - x 25 - technique     X 30:  Step up 8 inch   Air ex hip abduction, extension   Side step up 8 inch   4 inch ant tap down - heel strike emphasis   NBOS 30 sec x 3 eze   SLS 15 sec x 3 eze     Supine 3 x 10 eze:  SAQ 3#   SLR 2#  Bridges   S/l abduction 2# x20     Seated 3 x 10:  LAQ 2#  RTB HS curl      - Manual Therapy:  AA ROM end range flex/ext    - Modalities:    CP x 10 min supine     ASSESSMENT:  Pt marco well with no reports of pain during exercise progression. Pt able to improve marco of dynamic strength in CKC with cues for upright posture. Pt continues to have movement pattern of flexion forward trunk with all mobility. Improved gait with equal step length. Pt is progressing in strength well. Improved balance reactions today.     PLAN:   Progress to goals.       Billing:     PT Therapeutic Procedures Time Entry  Therapeutic Exercise Time Entry: 50

## 2024-07-30 ENCOUNTER — TREATMENT (OUTPATIENT)
Dept: PHYSICAL THERAPY | Facility: CLINIC | Age: 64
End: 2024-07-30
Payer: MEDICARE

## 2024-07-30 DIAGNOSIS — Z96.651 S/P TKR (TOTAL KNEE REPLACEMENT) USING CEMENT, RIGHT: ICD-10-CM

## 2024-07-30 DIAGNOSIS — Z74.09 IMPAIRED FUNCTIONAL MOBILITY, BALANCE, GAIT, AND ENDURANCE: ICD-10-CM

## 2024-07-30 DIAGNOSIS — Z96.651 S/P TKR (TOTAL KNEE REPLACEMENT), RIGHT: ICD-10-CM

## 2024-07-30 PROCEDURE — 97110 THERAPEUTIC EXERCISES: CPT | Mod: GP

## 2024-07-30 NOTE — PROGRESS NOTES
Physical Therapy Treatment    Patient Name: Toby Olea  MRN: [id number]    Today's Date: 8/8/2024    Insurance:  Visit number: 6 of 9  Insurance Type: Payor: [employer] / Plan: [employer] / Product Type: *No Product type* /   Authorization or Plan of Care date Range: 8/1/24    Time Calculation  Start Time: 0730  Stop Time: 0815  Time Calculation (min): 45 min    Diagnosis:   1. S/P total knee arthroplasty, left        2. Impaired functional mobility, balance, gait, and endurance  Follow Up In Physical Therapy          PRECAUTIONS:  Right TKA     SUBJECTIVE:  Less sore overall. Has been able to do more on his feet. On long trips stops frequently to stretch. Feels ok.     OBJECTIVE:  Less UE assist with balance activity      TREATMENT:  - Therex:  Bike level 10, 10 min.   Stair stretches - hip flex, hamstring, gastroc 3 x 20 sec eze   TG stop at 7 - x 25 - technique     X 30:  Step up 8 inch   Air ex hip abduction, extension   Side step up 8 inch   4 inch ant tap down - heel strike emphasis   NBOS 60 sec x 3 eze air ex   SLS 15 sec x 3 eze     Supine 3 x 10 eze:  SAQ 3#   SLR 2#  Bridges   S/l abduction 2# x20     Seated 3 x 10:  LAQ 2#  RTB HS curl  Sit to stand elevated mat squats     - Manual Therapy:  AA ROM end range flex/ext    - Modalities:    CP x 10 min supine     ASSESSMENT:  Pt marco well with no reports of pain during exercise progression. Pt with challenge to sit to stand when using good technique. Pt is with good progression of dynamic balance and LE strength activity. Fatigued end of session.       PLAN:   Progress to goals.       Billing:     PT Therapeutic Procedures Time Entry  Therapeutic Exercise Time Entry: 45

## 2024-08-01 ENCOUNTER — TREATMENT (OUTPATIENT)
Dept: PHYSICAL THERAPY | Facility: CLINIC | Age: 64
End: 2024-08-01
Payer: MEDICARE

## 2024-08-01 DIAGNOSIS — Z96.651 S/P TKR (TOTAL KNEE REPLACEMENT) USING CEMENT, RIGHT: ICD-10-CM

## 2024-08-01 DIAGNOSIS — Z74.09 IMPAIRED FUNCTIONAL MOBILITY, BALANCE, GAIT, AND ENDURANCE: ICD-10-CM

## 2024-08-01 DIAGNOSIS — Z96.651 S/P TKR (TOTAL KNEE REPLACEMENT), RIGHT: ICD-10-CM

## 2024-08-01 PROCEDURE — 97110 THERAPEUTIC EXERCISES: CPT | Mod: GP

## 2024-08-01 NOTE — PROGRESS NOTES
Physical Therapy Treatment    Patient Name: Toby Olea  MRN: [id number]    Today's Date: 8/8/2024    Insurance:  Visit number: 7 of 9  Insurance Type: [facility] / Plan: [employer] / Product Type: *No Product type* /   Authorization or Plan of Care date Range: 8/1/24    Time Calculation  Start Time: 0730  Stop Time: 0820  Time Calculation (min): 50 min    Diagnosis:   1. S/P total knee arthroplasty, left        2. Impaired functional mobility, balance, gait, and endurance  Follow Up In Physical Therapy          PRECAUTIONS:  Right TKA     SUBJECTIVE:  Denies pain. Is active at home. Has a click in the knee at times. Not really even swelling.     OBJECTIVE:    SLS with no UE assist 6 sec eze     TREATMENT:  - Therex:  Bike level 10, 10 min.   Stair stretches - hip flex, hamstring, gastroc 3 x 20 sec eze   TG stop at 7 - x 25 - technique     X 30:  Step up 8 inch   Air ex hip abduction, extension   Side step up 8 inch   4 inch ant tap down - heel strike emphasis   NBOS 60 sec x 3 eze air ex   SLS 15 sec x 3 eze   Air ex step over   NBOS activity      Supine 3 x 10 eze:  SAQ 3#   SLR 2#  Bridges   S/l abduction 2# x20     Seated 3 x 10:  LAQ 2#  RTB HS curl  Sit to stand elevated mat squats     - Manual Therapy:  AA ROM end range flex/ext    - Modalities:    CP x 10 min supine     ASSESSMENT:  Pt able to complete more dynamic LE strength and balance activity with  minimal to no UE assist. Improved marco of eccentric knee activity. Progressing in strength well. Scar is healing well. Negative homans eze. Pt with relief with CP and rest.     PLAN:   Progress to goals.       Billing:     PT Therapeutic Procedures Time Entry  Therapeutic Exercise Time Entry: 50

## 2024-08-06 ENCOUNTER — TREATMENT (OUTPATIENT)
Dept: PHYSICAL THERAPY | Facility: CLINIC | Age: 64
End: 2024-08-06
Payer: MEDICARE

## 2024-08-06 DIAGNOSIS — Z96.652 S/P TOTAL KNEE ARTHROPLASTY, LEFT: Primary | ICD-10-CM

## 2024-08-06 DIAGNOSIS — Z74.09 IMPAIRED FUNCTIONAL MOBILITY, BALANCE, GAIT, AND ENDURANCE: ICD-10-CM

## 2024-08-06 PROCEDURE — 97110 THERAPEUTIC EXERCISES: CPT | Mod: GP

## 2024-08-06 NOTE — PROGRESS NOTES
Physical Therapy Treatment    Patient Name: Toby Olea  MRN: [id number]    Today's Date: 8/8/2024    Insurance:  Visit number: 8 of 16  Insurance Type: [facility] / Plan: [employer] / Product Type: *No Product type* /   Authorization or Plan of Care date Range: 9/9/24  48 units     Time Calculation  Start Time: 0730  Stop Time: 0820  Time Calculation (min): 50 min    Diagnosis:   1. S/P total knee arthroplasty, left        2. Impaired functional mobility, balance, gait, and endurance  Follow Up In Physical Therapy          PRECAUTIONS:  Right TKA     SUBJECTIVE:  Doing well at home, has tightness but not really any pain. Has not been swelling.     OBJECTIVE:  Reduced UE assist to minimal with balance activity     TREATMENT:  - Therex:  Bike level 10, 10 min.   Stair stretches - hip flex, hamstring, gastroc 3 x 20 sec eze   TG stop at 7 - x 25 - technique     X 30:  Step up 8 inch   Air ex hip abduction, extension   Side step up 8 inch   4 inch ant tap down - heel strike emphasis   NBOS 60 sec x 3 eze air ex   SLS 15 sec x 3 eze   Air ex step over   NBOS activity      Supine 3 x 10 eze:  SAQ 3#   SLR 2#  Bridges   S/l abduction 2# x20     Seated 3 x 10:    Sit to stand elevated mat squats     - Manual Therapy:  AA ROM end range flex/ext    - Modalities:    CP x 10 min supine     ASSESSMENT:  Pt marco well with no reports of pain. Challenged with SLS and NBOS activity. Improved marco of exercise without UE assist. Pt is progressing in endurance with less dependency on UE and reduced need for rest. Pt with improved step down motion and tolerance.       PLAN:   Progress to goals.       Billing:     PT Therapeutic Procedures Time Entry  Therapeutic Exercise Time Entry: 50

## 2024-08-08 ENCOUNTER — HOSPITAL ENCOUNTER (OUTPATIENT)
Dept: RADIOLOGY | Facility: CLINIC | Age: 64
Discharge: HOME | End: 2024-08-08
Payer: MEDICARE

## 2024-08-08 ENCOUNTER — OFFICE VISIT (OUTPATIENT)
Dept: ORTHOPEDIC SURGERY | Facility: CLINIC | Age: 64
End: 2024-08-08
Payer: MEDICARE

## 2024-08-08 ENCOUNTER — TREATMENT (OUTPATIENT)
Dept: PHYSICAL THERAPY | Facility: CLINIC | Age: 64
End: 2024-08-08
Payer: MEDICARE

## 2024-08-08 DIAGNOSIS — Z96.652 S/P TOTAL KNEE ARTHROPLASTY, LEFT: ICD-10-CM

## 2024-08-08 DIAGNOSIS — Z74.09 IMPAIRED FUNCTIONAL MOBILITY, BALANCE, GAIT, AND ENDURANCE: ICD-10-CM

## 2024-08-08 DIAGNOSIS — Z96.652 S/P TOTAL KNEE ARTHROPLASTY, LEFT: Primary | ICD-10-CM

## 2024-08-08 PROCEDURE — 99211 OFF/OP EST MAY X REQ PHY/QHP: CPT | Performed by: PHYSICIAN ASSISTANT

## 2024-08-08 PROCEDURE — 97110 THERAPEUTIC EXERCISES: CPT | Mod: GP

## 2024-08-08 PROCEDURE — 73562 X-RAY EXAM OF KNEE 3: CPT | Mod: LT

## 2024-08-08 ASSESSMENT — PAIN - FUNCTIONAL ASSESSMENT: PAIN_FUNCTIONAL_ASSESSMENT: NO/DENIES PAIN

## 2024-08-08 NOTE — PROGRESS NOTES
Alka Quevedo, RUSSS, PA-C, ATC  Adult Reconstruction and Joint Replacement Surgery  Phone: [phone]     Fax:[phone]            Chief Complaint   Patient presents with    Left Knee - Post-op       HPI:  Toby Olea is a pleasant 63 y.o. year-old male here for follow-up of their side: left total knee arthroplasty by Dr. Loving.  The patient is approximately 6 week(s) postop.The patient has none mechanical symptoms.  The patient has none swelling and pain.   The patients wound has healed uneventfully.  The patient has been doing HEP and/or outpatient PT.  No complications postoperatively.      Review of Systems  Past Medical History:   Diagnosis Date    GERD (gastroesophageal reflux disease)     Hyperlipidemia     Hypertension 04/01/2006     Patient Active Problem List   Diagnosis    Benign essential hypertension    Elevated glucose    High calcium levels    Hyperlipidemia    Hypokalemia    Skin lesion of back    BMI 28.0-28.9,adult    Unilateral primary osteoarthritis, right knee    Localized osteoarthritis of right knee    Unilateral primary osteoarthritis, left knee    S/P TKR (total knee replacement), right    Impaired functional mobility, balance, gait, and endurance    S/P total knee arthroplasty, left     Medication Documentation Review Audit       Reviewed by Rosetta Mcgrath LPN (Licensed Nurse) on 08/08/24 at 1109      Medication Order Taking? Sig Documenting Provider Last Dose Status   CHOLECALCIFEROL, VITAMIN D3, ORAL [id number] Yes Take by mouth once daily. Historical Provider, MD Taking Active   co Q10-red yeast rice  mg capsule [id number] Yes Take by mouth 2 times a day. Historical Provider, MD Taking Active   losartan (Cozaar) 100 mg tablet [id number] Yes Take 1 tablet (100 mg) by mouth once daily. Historical Provider, MD Taking Active   metFORMIN (Glucophage) 500 mg tablet [id number] Yes Take 1 tablet (500 mg) by mouth 2 times daily (morning and late afternoon). Historical  Provider, MD Taking Active   multivitamin tablet [id number] Yes Take 1 tablet by mouth once daily. Historical Provider, MD Taking Active   pantoprazole (ProtoNix) 40 mg EC tablet [id number]  Take 1 tablet (40 mg) by mouth once daily. Do not crush, chew, or split. Alka Quevedo PA-C   24 4500   polyethylene glycol (Glycolax, Miralax) 17 gram/dose powder [id number]  Mix 1 cap (17g) into 8 ounces of fluid and drink by mouth once daily. Alka Quevedo PA-C  Active   potassium chloride CR (Klor-Con M20) 20 mEq ER tablet [id number] Yes Take 1 tablet (20 mEq) by mouth 2 times a day. Historical Provider, MD Taking Active   SAW PALMETTO ORAL [id number] Yes Take by mouth once daily. Historical Provider, MD Taking Active                  No Known Allergies  Social History     Socioeconomic History    Marital status:      Spouse name: Not on file    Number of children: Not on file    Years of education: Not on file    Highest education level: Not on file   Occupational History    Not on file   Tobacco Use    Smoking status: Never    Smokeless tobacco: Never   Vaping Use    Vaping status: Never Used   Substance and Sexual Activity    Alcohol use: Not Currently     Alcohol/week: 3.0 standard drinks of alcohol     Types: 3 Standard drinks or equivalent per week    Drug use: Never    Sexual activity: Not on file   Other Topics Concern    Not on file   Social History Narrative    Not on file     Social Determinants of Health     Financial Resource Strain: Not on file   Food Insecurity: Not on file   Transportation Needs: No Transportation Needs (7/3/2024)    OASIS : Transportation     Lack of Transportation (Medical): No     Lack of Transportation (Non-Medical): No     Patient Unable or Declines to Respond: No   Physical Activity: Not on file   Stress: Not on file   Social Connections: Feeling Socially Integrated (7/3/2024)    OASIS : Social Isolation     Frequency of experiencing loneliness or isolation:  Never   Intimate Partner Violence: Not on file   Housing Stability: Not on file     Past Surgical History:   Procedure Laterality Date    APPENDECTOMY  09/04/2018    Appendectomy    COLONOSCOPY      KNEE ARTHROSCOPY W/ MENISCAL REPAIR  09/04/2018    Knee Arthroscopy With Medial Meniscus Repair    MENISCECTOMY      OTHER SURGICAL HISTORY  09/04/2018    Oral Surgery Tooth Extraction Keiser Tooth    TOTAL KNEE ARTHROPLASTY Right     VASECTOMY  09/04/2018    Surgery Vas Deferens Vasectomy       Physical Exam  side: left Knee  There were no vitals filed for this visit.  AxO x 3 in NAD.   Assistive Device: no device. Coordination and balance intact.  Normal bilateral upper and lower extremities.  No erythema, ecchymosis, temperature changes. No popliteal lymphadenopathy,  No overlying lesion  Mood: euthymic  Respirations non-labored  The incision is midline healing well with no signs of surrounding infection, dehiscence or drainage.   Neurovascular exam is at baseline.  No instability varus or valgus stressing the knee at 0, 30 or 60 degrees.  No instability in the AP plane at 90 degrees.  Range of motion: 0 degrees extension, 120 degrees flexion  5/5 hip flexion/knee extension/DF/PF/EHL  SILT in leonidas/saph/ per/tib distribution   Extremities warm and well perfused.  No lower extremity calf tenderness, warmth or swelling. Lower extremity well perfused  2+ Femoral/DP/PT pulses bilaterally    Imaging:  No images are attached to the encounter.    I personally reviewed multiple views of the knee today in clinic. Status post side: left Total Knee aArthroplasty. The implant is well fixed, well aligned.  No evidence of abelardo-implant fracture, lucency or dislocation.    Impression/Plan:  Toby Olea is doing well post-operatively and happy with the results of the operation.     S/P side: left Total Knee Arthroplasty  I talked with patient at length about activity precautions and progression of activities. The patient understands  their permanent precautions. At this time, you may gradually increase your activities and get back to a normal, low-impact lifestyle. Please avoid running, jumping, and heavy lifting.      3. Continue HEP or outpatient PT, per protocol.    4. Continue Post-operative instructions.    5. Discussed the importance of dental prophylactic dental antibiotics lifelong. Patient may request medication refill through MyChart,       Pharmacy or surgeons office.    All questions answered.    Follow-up 1 year with x-rays at next visit.    KAREY Cardona, PA-C, ATC  Orthopedic Physician Assisant  Adult Reconstruction and Total Joint Replacement  General Orthopedics  Department of Orthopaedic Surgery  [place] [id number]  [employer] messaging preferred

## 2024-08-08 NOTE — PROGRESS NOTES
Physical Therapy Treatment    Patient Name: Toby Olea  MRN: [id number]    Today's Date: 8/8/2024    Insurance:  Visit number: 9 of 16  Insurance Type: Payor: [facility] [employer] / Plan: [employer] / Product Type: *No Product type* /   Authorization or Plan of Care date Range: 9/9/24  48 units     Time Calculation  Start Time: 0730  Stop Time: 0820  Time Calculation (min): 50 min    Diagnosis:   1. S/P total knee arthroplasty, left        2. Impaired functional mobility, balance, gait, and endurance            PRECAUTIONS:  Right TKA     SUBJECTIVE:  Feels good currently. Not much pain. More tight.     OBJECTIVE:  Reduced UE assist to minimal with balance activity     TREATMENT:  - Therex:  Bike level 10, 10 min.     Stair stretches - hip flex, hamstring, gastroc 3 x 20 sec eze   TG stop at 7 - x 25 - technique     X 30:  Step up 8 inch   Air ex hip abduction, extension   Side step up 8 inch   4 inch ant tap down - heel strike emphasis   NBOS 60 sec x 3 eze air ex   SLS 15 sec x 3 eze   Air ex step over   NBOS activity      Supine 3 x 10 eze:  SAQ 3#   SLR 2#  Bridges   S/l abduction 2# x20     Seated 3 x 10:  Sit to stand elevated mat squats     - Manual Therapy:  AA ROM end range flex/ext    - Modalities:    CP x 10 min supine     ASSESSMENT:  Pt marco well with cues for focus on technique more than speed. Pt with tendency to go fast and over rely on UE. Pt cued for quality of activity and exercise. Progressing well. Mild fatigue. Challenged with balance activity.       PLAN:   Progress to goals.       Billing:     PT Therapeutic Procedures Time Entry  Therapeutic Exercise Time Entry: 50

## 2024-08-13 ENCOUNTER — TREATMENT (OUTPATIENT)
Dept: PHYSICAL THERAPY | Facility: CLINIC | Age: 64
End: 2024-08-13
Payer: MEDICARE

## 2024-08-13 DIAGNOSIS — Z74.09 IMPAIRED FUNCTIONAL MOBILITY, BALANCE, GAIT, AND ENDURANCE: ICD-10-CM

## 2024-08-13 DIAGNOSIS — Z96.652 S/P TOTAL KNEE ARTHROPLASTY, LEFT: Primary | ICD-10-CM

## 2024-08-13 PROCEDURE — 97110 THERAPEUTIC EXERCISES: CPT | Mod: GP

## 2024-08-13 NOTE — PROGRESS NOTES
Physical Therapy Treatment    Patient Name: Toby Olea  MRN: [id number]    Today's Date: 8/13/2024    Insurance:  Visit number: 10 of 16  Insurance Type: [facility] [employer] / Plan: [employer] / Product Type: *No Product type* /   Authorization or Plan of Care date Range: 9/9/24  48 units     Time Calculation  Start Time: 0730  Stop Time: 0820  Time Calculation (min): 50 min    Diagnosis:   1. S/P total knee arthroplasty, left        2. Impaired functional mobility, balance, gait, and endurance  Follow Up In Physical Therapy          PRECAUTIONS:  Right TKA     SUBJECTIVE:  Feels tight still. Denies pain.     OBJECTIVE:       TREATMENT:  - Therex:  Bike level 10, 10 min.     Stair stretches - hip flex, hamstring, gastroc 3 x 20 sec eze   TG stop at 7 - x 25 - technique     X 30:  Step up 8 inch   Air ex hip abduction, extension   Side step up 8 inch   6 inch ant tap down - heel strike emphasis   NBOS 60 sec x 3 eze air ex   SLS 15 sec x 3 eze   Air ex step over AP, lateral   NBOS activity      Supine 3 x 10 eze:  SAQ 3#   SLR 2#  Bridges   S/l abduction 2# x20     Seated 3 x 10:  Sit to stand elevated mat squats     - Manual Therapy:  AA ROM end range flex/ext    - Modalities:    CP x 10 min supine     ASSESSMENT:  Pt able to marco well. Pt with minimal cues to reduce UE assist and forward bending. Pt with improved marco of dynamic balance training. Progressing well toward goals. Improved endurance. Pt with improved ease of eccentric flexion LLE. Mild fatigue, SOBOE with dynamic activity. Cued for breathing and rest.       PLAN:   Progress to goals.       Billing:     PT Therapeutic Procedures Time Entry  Therapeutic Exercise Time Entry: 50

## 2024-08-15 ENCOUNTER — TREATMENT (OUTPATIENT)
Dept: PHYSICAL THERAPY | Facility: CLINIC | Age: 64
End: 2024-08-15
Payer: MEDICARE

## 2024-08-15 DIAGNOSIS — Z74.09 IMPAIRED FUNCTIONAL MOBILITY, BALANCE, GAIT, AND ENDURANCE: ICD-10-CM

## 2024-08-15 PROCEDURE — 97110 THERAPEUTIC EXERCISES: CPT | Mod: GP

## 2024-08-15 NOTE — PROGRESS NOTES
Physical Therapy Treatment    Patient Name: Toby Olea  MRN: [id number]    Today's Date: 8/20/2024    Insurance:  Visit number: 11 of 16  Insurance Type: Payor: [email or web address] / Plan: [employer] / Product Type: *No Product type* /   Authorization or Plan of Care date Range: 9/9/24  48 units     Time Calculation  Start Time: 0730  Stop Time: 0820  Time Calculation (min): 50 min    Diagnosis:   1. S/P total knee arthroplasty, left        2. Impaired functional mobility, balance, gait, and endurance  Follow Up In Physical Therapy          PRECAUTIONS:  Right TKA     SUBJECTIVE:  Reports he feels OK. Feels like he is moving well. Had a good follow up with MD.     OBJECTIVE:   Able to transfers INDP     TREATMENT:  - Therex:  Bike level 10, 10 min.     Stair stretches - hip flex, hamstring, gastroc 3 x 20 sec eze   TG stop at 7 - x 25 - technique     X 30:  Step up 8 inch   Air ex hip abduction, extension   Side step up 8 inch   6 inch ant tap down - heel strike emphasis   NBOS 60 sec x 3 eze air ex   SLS 15 sec x 3 eze   Air ex step over AP, lateral   NBOS activity      Supine 3 x 10 eze:  SAQ 3#   SLR 2#  Bridges   S/l abduction 2# x20     Seated 3 x 10:  Sit to stand elevated mat squats     - Manual Therapy:  AA ROM end range flex/ext    - Modalities:    CP x 10 min supine     ASSESSMENT:  Pt maroc well with cues to reduce UE reliance during exercise. Pt with improved marco of eccentric loading. Progressing well. Cued for TKE in stance. Mild fatigue with exercise.     PLAN:   Progress to goals.       Billing:     PT Therapeutic Procedures Time Entry  Therapeutic Exercise Time Entry: 50

## 2024-08-20 ENCOUNTER — TREATMENT (OUTPATIENT)
Dept: PHYSICAL THERAPY | Facility: CLINIC | Age: 64
End: 2024-08-20
Payer: MEDICARE

## 2024-08-20 DIAGNOSIS — Z96.652 S/P TOTAL KNEE ARTHROPLASTY, LEFT: ICD-10-CM

## 2024-08-20 DIAGNOSIS — Z74.09 IMPAIRED FUNCTIONAL MOBILITY, BALANCE, GAIT, AND ENDURANCE: Primary | ICD-10-CM

## 2024-08-20 PROCEDURE — 97110 THERAPEUTIC EXERCISES: CPT | Mod: GP

## 2024-08-20 NOTE — PROGRESS NOTES
Physical Therapy Treatment    Patient Name: Toby Olea  MRN: [id number]    Today's Date: 8/20/2024    Insurance:  Visit number: 12 of 16  Insurance Type: Payor: [employer] / Plan: [employer] / Product Type: *No Product type* /   Authorization or Plan of Care date Range: 9/9/24  48 units     Time Calculation  Start Time: 0730  Stop Time: 0820  Time Calculation (min): 50 min    Diagnosis:   1. Impaired functional mobility, balance, gait, and endurance  Follow Up In Physical Therapy      2. S/P total knee arthroplasty, left            PRECAUTIONS:  Right TKA     SUBJECTIVE:  Feels OK. Balance is ok.     OBJECTIVE:  Right 2-127 AROM   Left 3-128 AROM     TREATMENT:  - Therex:  Bike level 10, 10 min.     Stair stretches - hip flex, hamstring, gastroc 3 x 20 sec eze   TG stop at 7 - x 25 - technique     X 30:  Step up 8 inch   Air ex hip abduction, extension   Side step up 8 inch   6 inch ant tap down - heel strike emphasis   NBOS 60 sec x 3 eze air ex   SLS 15 sec x 3 eze   Air ex step over AP, lateral   NBOS activity      Supine 3 x 10 eze:  SAQ 3#   SLR 2#  Bridges   S/l abduction 2# x20     Seated 3 x 10:  Sit to stand elevated mat squats     - Manual Therapy:  AA ROM end range flex/ext    - Modalities:    CP x 10 min supine     ASSESSMENT:  Pt with good marco of dynamic exercise progression. Pt with challenge to eccentric loading. Improved strength demonstrated. Will need to emphasize extension with resting/HEP. Pt with improved marco resistance. UE assist needed with balance activity. Flexion is doing well eze.     PLAN:   Progress to goals.       Billing:     PT Therapeutic Procedures Time Entry  Therapeutic Exercise Time Entry: 50

## 2024-08-22 ENCOUNTER — TREATMENT (OUTPATIENT)
Dept: PHYSICAL THERAPY | Facility: CLINIC | Age: 64
End: 2024-08-22
Payer: MEDICARE

## 2024-08-22 DIAGNOSIS — Z74.09 IMPAIRED FUNCTIONAL MOBILITY, BALANCE, GAIT, AND ENDURANCE: Primary | ICD-10-CM

## 2024-08-22 DIAGNOSIS — Z96.652 S/P TOTAL KNEE ARTHROPLASTY, LEFT: ICD-10-CM

## 2024-08-22 PROCEDURE — 97110 THERAPEUTIC EXERCISES: CPT | Mod: GP

## 2024-08-22 NOTE — PROGRESS NOTES
Physical Therapy Treatment    Patient Name: Toby Olea  MRN: [id number]    Today's Date: 8/22/2024    Insurance:  Visit number: 13 of 16  Insurance Type: Payor: [facility] [employer] / Plan: [employer] / Product Type: *No Product type* /   Authorization or Plan of Care date Range: 9/9/24  48 units     Time Calculation  Start Time: 0730  Stop Time: 0820  Time Calculation (min): 50 min    Diagnosis:   1. Impaired functional mobility, balance, gait, and endurance  Follow Up In Physical Therapy      2. S/P total knee arthroplasty, left            PRECAUTIONS:  Right TKA     SUBJECTIVE:  Feels ok. Is doing ok, mild fatigue.   Denies pain. Sometimes feels tight.     OBJECTIVE:  SLS with 1 UE touch point marco eze       TREATMENT:  - Therex:  Bike level 10, 10 min.     Stair stretches - hip flex, hamstring, gastroc 3 x 20 sec eze   TG stop at 7 - x 30 - technique     X 30:  Step up 8 inch   Air ex hip abduction, extension   Side step up 8 inch   6 inch ant tap down - heel strike emphasis   NBOS 60 sec x 3 eze air ex   SLS 15 sec x 3 eze   Air ex step over AP, lateral   NBOS activity      Supine 3 x 10 eze:  SAQ 3#   SLR 2#  Bridges   S/l abduction 2# x20     Seated 3 x 10:  Sit to stand elevated mat squats   LAQ 3#     - Manual Therapy:  AA ROM end range flex/ext    - Modalities:    CP x 10 min supine     ASSESSMENT:    Pt marco well with no reports of pain. Progression of SLS and NBOS balance activity. Improved upright posture consistently. Emphasized TKE exercise for HEP. Marco well. Challenged with SLS versus NBOS. Cues for TKE with SLR.     PLAN:   Progress to goals.       Billing:     PT Therapeutic Procedures Time Entry  Therapeutic Exercise Time Entry: 50

## 2024-08-27 ENCOUNTER — TREATMENT (OUTPATIENT)
Dept: PHYSICAL THERAPY | Facility: CLINIC | Age: 64
End: 2024-08-27
Payer: MEDICARE

## 2024-08-27 DIAGNOSIS — Z74.09 IMPAIRED FUNCTIONAL MOBILITY, BALANCE, GAIT, AND ENDURANCE: ICD-10-CM

## 2024-08-27 PROCEDURE — 97110 THERAPEUTIC EXERCISES: CPT | Mod: GP

## 2024-08-27 NOTE — PROGRESS NOTES
Physical Therapy Treatment    Patient Name: Toby Olea  MRN: [id number]    Today's Date: 8/27/2024    Insurance:  Visit number: 14 of 16  Insurance Type: [facility] / Plan: [employer] / Product Type: *No Product type* /   Authorization or Plan of Care date Range: 9/9/24  48 units     Time Calculation  Start Time: 0730  Stop Time: 0820  Time Calculation (min): 50 min    Diagnosis:   1. Impaired functional mobility, balance, gait, and endurance  Follow Up In Physical Therapy          PRECAUTIONS:  Right TKA     SUBJECTIVE:  Had a good weekend. Knees holding up ok. Feels balance is getting better.       OBJECTIVE:    Ambulation with improved trunk control and TKE     TREATMENT:  - Therex:  Bike level 10, 10 min.     Stair stretches - hip flex, hamstring, gastroc 3 x 20 sec eze   TG stop at 7 - x 30 - technique     X 30:  Step up 8 inch   Air ex hip abduction, extension   Side step up 8 inch   6 inch ant tap down - heel strike emphasis   NBOS 60 sec x 3 eze air ex   SLS 15 sec x 3 eze   Air ex step over AP, lateral   NBOS activity      Supine 3 x 10 eze:  SAQ 3#   SLR 2#  Bridges   S/l abduction 2# x20     Seated 3 x 10:  Sit to stand elevated mat squats   LAQ 3#     - Manual Therapy:  AA ROM end range flex/ext    - Modalities:    CP x 10 min supine     ASSESSMENT:  Pt with good marco of dynamic exercise program. Improved balance and balance reactions. Pt able to improve marco of activity with minimal to no UE assist. Pt is progressing well. Fatigued end of session.       PLAN:   Progress to goals.       Billing:     PT Therapeutic Procedures Time Entry  Therapeutic Exercise Time Entry: 50

## 2024-08-29 ENCOUNTER — TREATMENT (OUTPATIENT)
Dept: PHYSICAL THERAPY | Facility: CLINIC | Age: 64
End: 2024-08-29
Payer: MEDICARE

## 2024-08-29 DIAGNOSIS — Z96.652 S/P TOTAL KNEE ARTHROPLASTY, LEFT: Primary | ICD-10-CM

## 2024-08-29 DIAGNOSIS — Z74.09 IMPAIRED FUNCTIONAL MOBILITY, BALANCE, GAIT, AND ENDURANCE: ICD-10-CM

## 2024-08-29 PROCEDURE — 97110 THERAPEUTIC EXERCISES: CPT | Mod: GP

## 2024-08-29 NOTE — PROGRESS NOTES
Physical Therapy Treatment    Patient Name: Toby Olea  MRN: [id number]    Today's Date: 8/29/2024    Insurance:  Visit number: 15 of 16  Insurance Type: [facility]: [facility] / Plan: [employer] / Product Type: *No Product type* /   Authorization or Plan of Care date Range: 9/9/24  48 units     Time Calculation  Start Time: 0730  Stop Time: 0815  Time Calculation (min): 45 min    Diagnosis:   1. S/P total knee arthroplasty, left        2. Impaired functional mobility, balance, gait, and endurance  Follow Up In Physical Therapy          PRECAUTIONS:  Right TKA     SUBJECTIVE:  Pt with mild tightness only.     OBJECTIVE:    Ambulation with improved trunk control and TKE     TREATMENT:  - Therex:  Bike level 10, 10 min.     Stair stretches - hip flex, hamstring, gastroc 3 x 20 sec eze   TG stop at 7 - x 30 - technique     X 30:  Step up 8 inch   Air ex hip abduction, extension   Side step up 8 inch   6 inch ant tap down - heel strike emphasis   NBOS 60 sec x 3 eze air ex   SLS 15 sec x 3 eze   Air ex step over AP, lateral   NBOS activity      Supine 3 x 10 eze:  SAQ 3#   SLR 2#  Bridges   S/l abduction 2# x20     Seated 3 x 10:  Sit to stand elevated mat squats   LAQ 3#     - Manual Therapy:  AA ROM end range flex/ext    - Modalities:    CP x 10 min supine     ASSESSMENT:    Pt responded well to PT today. Progressed to dynamic surface balance training with minimal to no UE assist. Pt able to progress with dynamic strength focused on eccentric strength. Fatigue end of session.     PLAN:   Progress to goals.       Billing:     PT Therapeutic Procedures Time Entry  Therapeutic Exercise Time Entry: 45

## 2024-09-03 ENCOUNTER — TREATMENT (OUTPATIENT)
Dept: PHYSICAL THERAPY | Facility: CLINIC | Age: 64
End: 2024-09-03
Payer: MEDICARE

## 2024-09-03 DIAGNOSIS — Z74.09 IMPAIRED FUNCTIONAL MOBILITY, BALANCE, GAIT, AND ENDURANCE: ICD-10-CM

## 2024-09-03 DIAGNOSIS — Z96.652 S/P TOTAL KNEE ARTHROPLASTY, LEFT: ICD-10-CM

## 2024-09-03 PROCEDURE — 97110 THERAPEUTIC EXERCISES: CPT | Mod: GP

## 2024-09-03 NOTE — PROGRESS NOTES
Physical Therapy Treatment    Patient Name: Toby Olea  MRN: [id number]    Today's Date: 9/3/2024    Insurance:  Visit number: 16 of 16  Insurance Type: Payor: [employer] / Plan: [employer] / Product Type: *No Product type* /   Authorization or Plan of Care date Range: 9/9/24  48 units     Time Calculation  Start Time: 0730  Stop Time: 0820  Time Calculation (min): 50 min    Diagnosis:   1. Impaired functional mobility, balance, gait, and endurance  Follow Up In Physical Therapy      2. S/P total knee arthroplasty, left  Follow Up In Physical Therapy          PRECAUTIONS:  Right TKA     SUBJECTIVE:  Is feeling good. Denies issues with either knee. Feels like he can do more than he ever could pre op.     OBJECTIVE:  Right 2-128  Left 1-126    Hip flex eze 4+/5, abd 4+/5 eze, add 5/5, ext 4+/5 eze   Hamstring 5/5 eze, quad 4+/5 eze   DF PF 4+/5 eze     Ambulation WFL with no deviation or compensation eze     TREATMENT:  - Therex:  Bike level 10, 10 min.     Stair stretches - hip flex, hamstring, gastroc 3 x 20 sec eze   TG stop at 7 - x 30 - technique     X 30:  Step up 8 inch   Air ex hip abduction, extension   Side step up 8 inch   6 inch ant tap down - heel strike emphasis   NBOS 60 sec x 3 eze air ex   SLS 15 sec x 3 eze   Air ex step over AP, lateral   NBOS activity      Supine 3 x 10 eze:  SAQ 3#   SLR 2#  Bridges   S/l abduction 2# x20     Seated 3 x 10:  Sit to stand elevated mat squats   LAQ 3#     - Manual Therapy:  AA ROM end range flex/ext    - Modalities:    CP x 10 min supine     ASSESSMENT:    Pt has achieved all goals. Pt is doing well with HEP. Pt has returned to all activity. Pt is biking at home. Pt has progressed thru program well to functional INDP and marco greater than he had pre op. Pt is motivated and will do well continuing with a community based program.   PT treatment marco well.     PLAN:   Discharge to [facility].     ALL GOALS ACHIEVED 9/3/24  1) Pain will be reduced to  0/10 at rest no more than 2/10 with activity.   2) Function will be increased to be able to complete all household tasks safely INDP, return to community activity safely INDP   3) ROM will be increased to be WNL at 0-120  4) Strength to be increased to be WNL at 5/5 eze LE   5) Independent in Home Exercise Program  6) Independent return to boating, biking, and walking in the community safely INDP   7) Outcome tool improvement to LEFS 65/80  8) flexibility WFL eze LE   9) ambulation INDP with good technique no antalgically unrestricted by LLE.   10) balance SLS 12 sec eze         Billing:     PT Therapeutic Procedures Time Entry  Therapeutic Exercise Time Entry: 50

## 2025-06-12 ENCOUNTER — HOSPITAL ENCOUNTER (OUTPATIENT)
Dept: RADIOLOGY | Facility: CLINIC | Age: 65
Discharge: HOME | End: 2025-06-12
Payer: MEDICARE

## 2025-06-12 ENCOUNTER — APPOINTMENT (OUTPATIENT)
Dept: ORTHOPEDIC SURGERY | Facility: CLINIC | Age: 65
End: 2025-06-12
Payer: MEDICARE

## 2025-06-12 DIAGNOSIS — Z96.651 S/P TKR (TOTAL KNEE REPLACEMENT), RIGHT: ICD-10-CM

## 2025-06-12 PROCEDURE — 1159F MED LIST DOCD IN RCRD: CPT | Performed by: PHYSICIAN ASSISTANT

## 2025-06-12 PROCEDURE — 99212 OFFICE O/P EST SF 10 MIN: CPT | Performed by: PHYSICIAN ASSISTANT

## 2025-06-12 PROCEDURE — 1036F TOBACCO NON-USER: CPT | Performed by: PHYSICIAN ASSISTANT

## 2025-06-12 PROCEDURE — 73562 X-RAY EXAM OF KNEE 3: CPT | Mod: RT

## 2025-06-12 PROCEDURE — 99214 OFFICE O/P EST MOD 30 MIN: CPT | Performed by: PHYSICIAN ASSISTANT

## 2025-06-12 PROCEDURE — 73562 X-RAY EXAM OF KNEE 3: CPT | Mod: RIGHT SIDE | Performed by: RADIOLOGY

## 2025-06-12 NOTE — PROGRESS NOTES
KAREY Cardona, SHE, ATC  Adult Reconstruction and Joint Replacement Surgery  Phone: [phone]     Fax:[phone]            Chief Complaint   Patient presents with    Left Knee - Post-op       HPI:  Toby Olea is a pleasant 64 y.o. year-old male here for follow-up of their side: right total knee arthroplasty by Dr. Loving.  The patient is approximately 1 week(s) postop.The patient has no mechanical symptoms.  The patient has no swelling and pain.   The patients wound has healed uneventfully.  The patient has been doing HEP and/or outpatient PT.  No complications postoperatively.  The patient is very happy with the result of the operation.    Review of Systems  Medical History[1]  Problem List[2]  Medication Documentation Review Audit       Reviewed by Clara Stahl LPN (Licensed Nurse) on 25 at 1109      Medication Order Taking? Sig Documenting Provider Last Dose Status   CHOLECALCIFEROL, VITAMIN D3, ORAL [id number] No Take by mouth once daily. Historical Provider, MD Taking Active   co Q10-red yeast rice  mg capsule [id number] No Take by mouth 2 times a day. Historical Provider, MD Taking Active   losartan (Cozaar) 100 mg tablet [id number] No Take 1 tablet (100 mg) by mouth once daily. Historical Provider, MD Taking Active   metFORMIN (Glucophage) 500 mg tablet [id number] No Take 1 tablet (500 mg) by mouth 2 times daily (morning and late afternoon). Historical Provider, MD Taking Active   multivitamin tablet [id number] No Take 1 tablet by mouth once daily. Historical Provider, MD Taking Active   pantoprazole (ProtoNix) 40 mg EC tablet [id number]  Take 1 tablet (40 mg) by mouth once daily. Do not crush, chew, or split. Alka Quevedo PA-C   24 [id number]   polyethylene glycol (Glycolax, Miralax) 17 gram/dose powder [id number]  Mix 1 cap (17g) into 8 ounces of fluid and drink by mouth once daily. Alka Quevedo PA-C  Active   potassium chloride CR (Klor-Con M20) 20  mEq ER tablet [id number] No Take 1 tablet (20 mEq) by mouth 2 times a day. Historical Provider, MD Taking Active   SAW PALMETTO ORAL [id number] No Take by mouth once daily. Historical Provider, MD Taking Active                  RX Allergies[3]  Social History     Socioeconomic History    Marital status:      Spouse name: Not on file    Number of children: Not on file    Years of education: Not on file    Highest education level: Not on file   Occupational History    Not on file   Tobacco Use    Smoking status: Never    Smokeless tobacco: Never   Vaping Use    Vaping status: Never Used   Substance and Sexual Activity    Alcohol use: Not Currently     Alcohol/week: 3.0 standard drinks of alcohol     Types: 3 Standard drinks or equivalent per week    Drug use: Never    Sexual activity: Not on file   Other Topics Concern    Not on file   Social History Narrative    Not on file     Social Drivers of Health     Financial Resource Strain: Not on file   Food Insecurity: Not on file   Transportation Needs: No Transportation Needs (7/3/2024)    OASIS : Transportation     Lack of Transportation (Medical): No     Lack of Transportation (Non-Medical): No     Patient Unable or Declines to Respond: No   Physical Activity: Not on file   Stress: Not on file   Social Connections: Feeling Socially Integrated (7/3/2024)    OASIS : Social Isolation     Frequency of experiencing loneliness or isolation: Never   Intimate Partner Violence: Not on file   Housing Stability: Not on file     Surgical History[4]    Physical Exam  side: right Knee  There were no vitals filed for this visit.  AxO x 3 in NAD.   Assistive Device: no device. Coordination and balance intact.  Normal bilateral upper and lower extremities.  No erythema, ecchymosis, temperature changes. No popliteal lymphadenopathy,  No overlying lesion  Mood: euthymic  Respirations non-labored  The incision is midline healing well with no signs of surrounding infection,  dehiscence or drainage.   Neurovascular exam is at baseline.  No instability varus or valgus stressing the knee at 0, 30 or 60 degrees.  No instability in the AP plane at 90 degrees.  Range of motion: 0 degrees extension, 120 degrees flexion  5/5 hip flexion/knee extension/DF/PF/EHL  SILT in leonidas/saph/ per/tib distribution   Extremities warm and well perfused.  No lower extremity calf tenderness, warmth or swelling. Lower extremity well perfused  2+ Femoral/DP/PT pulses bilaterally    Imaging:    I personally reviewed multiple views of the knee today in clinic. Status post side: right Total Knee Arthroplasty. The implant is well fixed, well aligned.  No evidence of abelardo-implant fracture, lucency or dislocation.    Impression/Plan:  Toby Olea is doing well post-operatively and happy with the results of the operation.     S/P side: right Total Knee Arthroplasty  I talked with patient at length about activity precautions and progression of activities. The patient understands their permanent precautions.   3. Discussed the importance of dental prophylactic dental antibiotics lifelong. Patient may request medication refill through MyChart,       Pharmacy or surgeons office.    All questions answered.    Follow-up 5 years with x-rays at next visit.    Alka Quevedo MPAS, PA-C, ATC  Orthopedic Physician [employer]  Adult Reconstruction and Total Joint Replacement  General Orthopedics  Department of Orthopaedic Surgery  [place] [phone]  Asseta messaging preferred         [1]   Past Medical History:  Diagnosis Date    GERD (gastroesophageal reflux disease)     Hyperlipidemia     Hypertension 04/01/2006   [2]   Patient Active Problem List  Diagnosis    Benign essential hypertension    Elevated glucose    High calcium levels    Hyperlipidemia    Hypokalemia    Skin lesion of back    BMI 28.0-28.9,adult    Unilateral primary osteoarthritis, right  knee    Localized osteoarthritis of right knee    Unilateral primary osteoarthritis, left knee    S/P TKR (total knee replacement), right    Impaired functional mobility, balance, gait, and endurance    S/P total knee arthroplasty, left   [3] No Known Allergies  [4]   Past Surgical History:  Procedure Laterality Date    APPENDECTOMY  09/04/2018    Appendectomy    COLONOSCOPY      KNEE ARTHROSCOPY W/ MENISCAL REPAIR  09/04/2018    Knee Arthroscopy With Medial Meniscus Repair    MENISCECTOMY      OTHER SURGICAL HISTORY  09/04/2018    Oral Surgery Tooth Extraction [facility]    TOTAL KNEE ARTHROPLASTY Right     VASECTOMY  09/04/2018    Surgery Vas Deferens Vasectomy

## 2025-06-26 ENCOUNTER — APPOINTMENT (OUTPATIENT)
Dept: ORTHOPEDIC SURGERY | Facility: CLINIC | Age: 65
End: 2025-06-26
Payer: MEDICARE

## (undated) DEVICE — CHANNEL DRAIN, BARD, 15FR, ROUND HUBLESS, FULL FLUTED

## (undated) DEVICE — CUFF, TOURNIQUET, 30 X 4, DUAL PORT/SNGL BLADDER, DISP, LF

## (undated) DEVICE — SUTURE, VICRYL, 2-0, 18 IN CP-2, UNDYED

## (undated) DEVICE — BLADE, SAW, DUAL SAGITTAL, 1.9 X 90 X 30

## (undated) DEVICE — STRIP, SKIN CLOSURE, STERI STRIP, REINFORCED, 0.5 X 4 IN

## (undated) DEVICE — TUBING, IRRIGATION, HIGH FLOW, HAND PIECE SET

## (undated) DEVICE — GLOVE, SURGICAL, PROTEXIS PI , 6.5, PF, LF

## (undated) DEVICE — Device

## (undated) DEVICE — SYRINGE, 50 CC, LUER LOCK

## (undated) DEVICE — SUTURE, ETHIBOND, P2, V-37, 30 IN, GREEN

## (undated) DEVICE — SUTURE, MONOCRYL, 3-0, 27 IN, PS-2, UNDYED

## (undated) DEVICE — DRESSING, MEPILEX, BORDER LIGHT, 3 X 3 IN

## (undated) DEVICE — CEMENT, MIXEVAC III, 10S BOWL, KNEES

## (undated) DEVICE — DRAIN, WOUND, ROUND, W/TROCAR, HOLE PATTERN, 10 IN, MEDIUM/LARGE, 3/16 X 49 IN

## (undated) DEVICE — DRAPE, SHEET, U, W/ADHESIVE STRIP, IMPERVIOUS, 60 X 70 IN, DISPOSABLE, LF, STERILE

## (undated) DEVICE — GLOVE, SURGICAL, PROTEXIS PI , 8.0, PF, LF

## (undated) DEVICE — 3/16IN OD X 49IN, MEDIUM-LARGE SINGLE ROUND SILICONE DRAIN W/TROCAR, 10IN HOLE PATTERN

## (undated) DEVICE — SOLUTION, INJECTION, 0.9% SODIUM CHL, USP LIFECARE 1000 MI

## (undated) DEVICE — GLOVE, SURGICAL, PROTEXIS PI , 7.5, PF, LF

## (undated) DEVICE — DRAPE, IRRIGATION, W/POUCH, ADHESIVE STRIP, STERI DRAPE, 19 X 23 IN, DISPOSABLE, STERILE

## (undated) DEVICE — HOOD, SURGICAL, FLYTE HYBRID

## (undated) DEVICE — DRAPE, ISOLATION, INCISE, W/POUCH, STERI DRAPE, 125 X 83 IN, DISPOSABLE, STERILE

## (undated) DEVICE — 400CC THREE-SPRING HEMOVAC-TYPE EVACUATOR KIT W/ 3.2MM PVC DRAIN, 12IN HOLE PATTERN, TROCAR & Y-CONNECTOR TUBING

## (undated) DEVICE — SYRINGE, 60 CC, IRRIGATION, BULB, CONTRO-BULB, PAPER POUCH

## (undated) DEVICE — BLANKET, LOWER BODY, VHA PLUS, ADULT

## (undated) DEVICE — GLOVE, SURGICAL, PROTEXIS PI BLUE W/NEUTHERA, 7.0, PF, LF

## (undated) DEVICE — 10IN STRYKER SMOKE EVACUATION TUBING

## (undated) DEVICE — BANDAGE, COFLEX, 6 X 5 YDS, TAN, STERILE, LF

## (undated) DEVICE — NEEDLE, SPINAL, QUINCKE, 18 G X 3.5 IN, PINK HUB

## (undated) DEVICE — SUTURE, VICRYL, 0, 18 IN, CT-1, UNDYED